# Patient Record
Sex: FEMALE | Race: WHITE | Employment: UNEMPLOYED | RURAL
[De-identification: names, ages, dates, MRNs, and addresses within clinical notes are randomized per-mention and may not be internally consistent; named-entity substitution may affect disease eponyms.]

---

## 2019-07-08 ENCOUNTER — OFFICE VISIT (OUTPATIENT)
Dept: FAMILY MEDICINE CLINIC | Age: 54
End: 2019-07-08
Payer: COMMERCIAL

## 2019-07-08 ENCOUNTER — HOSPITAL ENCOUNTER (OUTPATIENT)
Facility: HOSPITAL | Age: 54
Discharge: HOME | End: 2019-07-08
Payer: COMMERCIAL

## 2019-07-08 VITALS
WEIGHT: 180 LBS | DIASTOLIC BLOOD PRESSURE: 88 MMHG | HEIGHT: 64 IN | OXYGEN SATURATION: 95 % | TEMPERATURE: 98.4 F | RESPIRATION RATE: 18 BRPM | HEART RATE: 95 BPM | BODY MASS INDEX: 30.73 KG/M2 | SYSTOLIC BLOOD PRESSURE: 150 MMHG

## 2019-07-08 DIAGNOSIS — J01.00 ACUTE NON-RECURRENT MAXILLARY SINUSITIS: ICD-10-CM

## 2019-07-08 DIAGNOSIS — R50.9 FEVER, UNSPECIFIED FEVER CAUSE: ICD-10-CM

## 2019-07-08 DIAGNOSIS — J02.9 SORE THROAT: ICD-10-CM

## 2019-07-08 DIAGNOSIS — J02.9 SORE THROAT: Primary | ICD-10-CM

## 2019-07-08 PROCEDURE — G8417 CALC BMI ABV UP PARAM F/U: HCPCS | Performed by: NURSE PRACTITIONER

## 2019-07-08 PROCEDURE — 3017F COLORECTAL CA SCREEN DOC REV: CPT | Performed by: NURSE PRACTITIONER

## 2019-07-08 PROCEDURE — 99203 OFFICE O/P NEW LOW 30 MIN: CPT | Performed by: NURSE PRACTITIONER

## 2019-07-08 PROCEDURE — 87880 STREP A ASSAY W/OPTIC: CPT | Performed by: NURSE PRACTITIONER

## 2019-07-08 PROCEDURE — 1036F TOBACCO NON-USER: CPT | Performed by: NURSE PRACTITIONER

## 2019-07-08 PROCEDURE — 96372 THER/PROPH/DIAG INJ SC/IM: CPT | Performed by: NURSE PRACTITIONER

## 2019-07-08 PROCEDURE — 36415 COLL VENOUS BLD VENIPUNCTURE: CPT

## 2019-07-08 PROCEDURE — G8427 DOCREV CUR MEDS BY ELIG CLIN: HCPCS | Performed by: NURSE PRACTITIONER

## 2019-07-08 RX ORDER — METHYLPREDNISOLONE ACETATE 80 MG/ML
80 INJECTION, SUSPENSION INTRA-ARTICULAR; INTRALESIONAL; INTRAMUSCULAR; SOFT TISSUE ONCE
Status: COMPLETED | OUTPATIENT
Start: 2019-07-08 | End: 2019-07-08

## 2019-07-08 RX ORDER — THYROID 60 MG
60 TABLET ORAL DAILY
Refills: 1 | COMMUNITY
Start: 2019-06-11

## 2019-07-08 RX ORDER — FLUTICASONE PROPIONATE 50 MCG
1 SPRAY, SUSPENSION (ML) NASAL DAILY
COMMUNITY

## 2019-07-08 RX ORDER — AZITHROMYCIN 250 MG/1
TABLET, FILM COATED ORAL
Qty: 1 PACKET | Refills: 0 | Status: SHIPPED | OUTPATIENT
Start: 2019-07-08 | End: 2019-07-30

## 2019-07-08 RX ORDER — ALBUTEROL SULFATE 90 UG/1
2 AEROSOL, METERED RESPIRATORY (INHALATION) EVERY 6 HOURS PRN
COMMUNITY
End: 2021-11-01 | Stop reason: ALTCHOICE

## 2019-07-08 RX ORDER — FEXOFENADINE HCL 180 MG/1
180 TABLET ORAL DAILY
COMMUNITY
End: 2021-11-01 | Stop reason: ALTCHOICE

## 2019-07-08 RX ADMIN — METHYLPREDNISOLONE ACETATE 80 MG: 80 INJECTION, SUSPENSION INTRA-ARTICULAR; INTRALESIONAL; INTRAMUSCULAR; SOFT TISSUE at 15:46

## 2019-07-08 SDOH — HEALTH STABILITY: MENTAL HEALTH: HOW MANY STANDARD DRINKS CONTAINING ALCOHOL DO YOU HAVE ON A TYPICAL DAY?: 1 OR 2

## 2019-07-08 SDOH — HEALTH STABILITY: MENTAL HEALTH: HOW OFTEN DO YOU HAVE A DRINK CONTAINING ALCOHOL?: 4 OR MORE TIMES A WEEK

## 2019-07-08 ASSESSMENT — ENCOUNTER SYMPTOMS
RHINORRHEA: 1
COUGH: 1
SORE THROAT: 1
GASTROINTESTINAL NEGATIVE: 1
EYES NEGATIVE: 1
URTICARIA: 1
SINUS PRESSURE: 1
NAUSEA: 0
SINUS PAIN: 1
ALLERGIC/IMMUNOLOGIC NEGATIVE: 1
VOMITING: 0

## 2019-07-08 NOTE — PROGRESS NOTES
Administrations This Visit     methylPREDNISolone acetate (DEPO-MEDROL) injection 80 mg     Admin Date  07/08/2019  15:46 Action  Given Dose  80 mg Route  Intramuscular Site  Dorsogluteal Right Administered By  Junaid Spencer MA    Ordering Provider:  ELBA Hernandez NP    NDC:  3107-2064-24    Lot#:  [id number]    :  [employer]    Patient Supplied?:  No    Comments:  exp 04/2020

## 2019-07-08 NOTE — PROGRESS NOTES
SUBJECTIVE:    Li Carrera is a 49 y.o. female    Pt also reports she broke out in hives yesterday. Pt reports hx of chronic hives. Pt did eat a new gluten free tortilla last night prior to onset of hives. Hives have resolved. Pt reports no hx of hypertension. BP is noted to be elevated today. Pt has been taking OTC Sudafed. Pt is concerned about possible Polycythemia Vera due to family history. Pt reports her mother and sister found out they had polycythemia vera when their BP became elevated. Pt is unsure when she had labs drawn last. Pt recently moved to [place] from [place]. Pharyngitis   This is a new problem. The current episode started in the past 7 days. The problem occurs constantly. The problem has been unchanged. Associated symptoms include chills, congestion (sinus congestion), coughing (productive cough- due to PND- Yellow/green sputum), fatigue (daughter diagnosed with Mono recently), a fever (101), headaches, myalgias (a few days ago) and a sore throat. Pertinent negatives include no nausea or vomiting. Associated symptoms comments: Daughter recently diagnosed with Mono, + frontal and maxillary sinus pressure- worse when bending over, Yellow/green nasal drainage  . The symptoms are aggravated by sneezing (worse when talking). She has tried NSAIDs for the symptoms. The treatment provided moderate relief. Urticaria   Associated symptoms include congestion (sinus congestion), coughing (productive cough- due to PND- Yellow/green sputum), fatigue (daughter diagnosed with Mono recently), a fever (101), rhinorrhea (yellow/green drainage) and a sore throat. Pertinent negatives include no vomiting. Chief Complaint   Patient presents with    Pharyngitis     onset over 1 week    Headache     Sinus headache    Urticaria     onset yesterday        Review of Systems   Constitutional: Positive for chills, fatigue (daughter diagnosed with Mono recently) and fever (101).    HENT: Positive for congestion (sinus congestion), ear pain (right ear fullness), postnasal drip, rhinorrhea (yellow/green drainage), sinus pressure, sinus pain, sneezing and sore throat. Negative for ear discharge. Eyes: Negative. Respiratory: Positive for cough (productive cough- due to PND- Yellow/green sputum). Cardiovascular: Negative. Gastrointestinal: Negative. Negative for nausea and vomiting. Endocrine: Negative. Genitourinary: Negative. Musculoskeletal: Positive for myalgias (a few days ago). Allergic/Immunologic: Negative. Neurological: Positive for headaches. Hematological: Negative. Psychiatric/Behavioral: Negative. OBJECTIVE:    BP (!) 150/88   Pulse 95   Temp 98.4 °F (36.9 °C) (Oral)   Resp 18   Ht 5' 3.5\" (1.613 m)   Wt 180 lb (81.6 kg)   SpO2 95%   BMI 31.39 kg/m²    Physical Exam   Constitutional: She appears well-developed and well-nourished. HENT:   Head: Normocephalic. Right Ear: Tympanic membrane, external ear and ear canal normal.   Left Ear: External ear and ear canal normal. A middle ear effusion (mild) is present. Nose: Mucosal edema present. Right sinus exhibits maxillary sinus tenderness and frontal sinus tenderness. Left sinus exhibits maxillary sinus tenderness and frontal sinus tenderness. Mouth/Throat: Uvula is midline and mucous membranes are normal. Posterior oropharyngeal erythema ( + PND) present. No oropharyngeal exudate or posterior oropharyngeal edema. Cardiovascular: Normal rate, regular rhythm and normal heart sounds. Pulmonary/Chest: Effort normal and breath sounds normal.   Lymphadenopathy:        Head (right side): No submental, no submandibular, no tonsillar, no preauricular, no posterior auricular and no occipital adenopathy present. Head (left side): No submental, no submandibular, no tonsillar, no preauricular, no posterior auricular and no occipital adenopathy present.      She has cervical adenopathy (posterior cervical nodes are

## 2019-07-09 LAB — S PYO AG THROAT QL: NORMAL

## 2019-07-19 DIAGNOSIS — N76.0 ACUTE VAGINITIS: Primary | ICD-10-CM

## 2019-07-19 RX ORDER — FLUCONAZOLE 150 MG/1
150 TABLET ORAL ONCE
Qty: 1 TABLET | Refills: 0 | Status: SHIPPED | OUTPATIENT
Start: 2019-07-19 | End: 2019-07-19

## 2019-07-30 ENCOUNTER — APPOINTMENT (OUTPATIENT)
Dept: GENERAL RADIOLOGY | Facility: HOSPITAL | Age: 54
End: 2019-07-30
Payer: COMMERCIAL

## 2019-07-30 ENCOUNTER — TELEPHONE (OUTPATIENT)
Dept: FAMILY MEDICINE CLINIC | Age: 54
End: 2019-07-30

## 2019-07-30 ENCOUNTER — HOSPITAL ENCOUNTER (EMERGENCY)
Facility: HOSPITAL | Age: 54
Discharge: NURSING, REHAB OR LONG-TERM CARE | End: 2019-07-30
Attending: HOSPITALIST
Payer: COMMERCIAL

## 2019-07-30 ENCOUNTER — OFFICE VISIT (OUTPATIENT)
Dept: FAMILY MEDICINE CLINIC | Age: 54
End: 2019-07-30
Payer: COMMERCIAL

## 2019-07-30 ENCOUNTER — HOSPITAL ENCOUNTER (OUTPATIENT)
Facility: HOSPITAL | Age: 54
Setting detail: OBSERVATION
Discharge: HOME | End: 2019-07-31
Attending: INTERNAL MEDICINE | Admitting: INTERNAL MEDICINE

## 2019-07-30 VITALS
DIASTOLIC BLOOD PRESSURE: 81 MMHG | HEART RATE: 72 BPM | BODY MASS INDEX: 30.73 KG/M2 | OXYGEN SATURATION: 100 % | TEMPERATURE: 98.4 F | RESPIRATION RATE: 15 BRPM | HEIGHT: 64 IN | WEIGHT: 180 LBS | SYSTOLIC BLOOD PRESSURE: 148 MMHG

## 2019-07-30 VITALS
HEART RATE: 85 BPM | OXYGEN SATURATION: 96 % | WEIGHT: 180.6 LBS | BODY MASS INDEX: 31.49 KG/M2 | DIASTOLIC BLOOD PRESSURE: 89 MMHG | SYSTOLIC BLOOD PRESSURE: 146 MMHG

## 2019-07-30 DIAGNOSIS — I48.91 ATRIAL FIBRILLATION, NEW ONSET (HCC): Primary | ICD-10-CM

## 2019-07-30 DIAGNOSIS — R07.89 CHEST PRESSURE: ICD-10-CM

## 2019-07-30 DIAGNOSIS — R94.31 ABNORMAL EKG: ICD-10-CM

## 2019-07-30 DIAGNOSIS — R03.0 ELEVATED BLOOD PRESSURE READING IN OFFICE WITHOUT DIAGNOSIS OF HYPERTENSION: Primary | ICD-10-CM

## 2019-07-30 DIAGNOSIS — E03.9 HYPOTHYROIDISM, UNSPECIFIED TYPE: ICD-10-CM

## 2019-07-30 DIAGNOSIS — I49.9 IRREGULAR HEART BEAT: ICD-10-CM

## 2019-07-30 PROBLEM — I48.0 PAROXYSMAL ATRIAL FIBRILLATION (HCC): Status: ACTIVE | Noted: 2019-07-30

## 2019-07-30 LAB
A/G RATIO: 1.3 (ref 0.8–2)
ALBUMIN SERPL-MCNC: 4.6 G/DL (ref 3.4–4.8)
ALP BLD-CCNC: 72 U/L (ref 25–100)
ALT SERPL-CCNC: 22 U/L (ref 4–36)
ANION GAP SERPL CALCULATED.3IONS-SCNC: 14 MMOL/L (ref 3–16)
AST SERPL-CCNC: 25 U/L (ref 8–33)
BASOPHILS ABSOLUTE: 0 K/UL (ref 0–0.1)
BASOPHILS RELATIVE PERCENT: 0.3 %
BILIRUB SERPL-MCNC: 0.4 MG/DL (ref 0.3–1.2)
BUN BLDV-MCNC: 18 MG/DL (ref 6–20)
CALCIUM SERPL-MCNC: 10.2 MG/DL (ref 8.5–10.5)
CHLORIDE BLD-SCNC: 102 MMOL/L (ref 98–107)
CO2: 24 MMOL/L (ref 20–30)
CREAT SERPL-MCNC: 0.7 MG/DL (ref 0.4–1.2)
EOSINOPHILS ABSOLUTE: 0.1 K/UL (ref 0–0.4)
EOSINOPHILS RELATIVE PERCENT: 1.5 %
GFR AFRICAN AMERICAN: >59
GFR NON-AFRICAN AMERICAN: >60
GLOBULIN: 3.6 G/DL
GLUCOSE BLD-MCNC: 100 MG/DL (ref 74–106)
HCT VFR BLD CALC: 47.5 % (ref 37–47)
HEMOGLOBIN: 16.1 G/DL (ref 11.5–16.5)
IMMATURE GRANULOCYTES #: 0 K/UL
IMMATURE GRANULOCYTES %: 0.2 % (ref 0–5)
LYMPHOCYTES ABSOLUTE: 2.2 K/UL (ref 1.5–4)
LYMPHOCYTES RELATIVE PERCENT: 25.2 %
MCH RBC QN AUTO: 31.6 PG (ref 27–32)
MCHC RBC AUTO-ENTMCNC: 33.9 G/DL (ref 31–35)
MCV RBC AUTO: 93.3 FL (ref 80–100)
MONOCYTES ABSOLUTE: 0.6 K/UL (ref 0.2–0.8)
MONOCYTES RELATIVE PERCENT: 6.8 %
NEUTROPHILS ABSOLUTE: 5.8 K/UL (ref 2–7.5)
NEUTROPHILS RELATIVE PERCENT: 66 %
PDW BLD-RTO: 12.9 % (ref 11–16)
PLATELET # BLD: 310 K/UL (ref 150–400)
PMV BLD AUTO: 9.9 FL (ref 6–10)
POTASSIUM REFLEX MAGNESIUM: 3.9 MMOL/L (ref 3.4–5.1)
RBC # BLD: 5.09 M/UL (ref 3.8–5.8)
SODIUM BLD-SCNC: 140 MMOL/L (ref 136–145)
TOTAL PROTEIN: 8.2 G/DL (ref 6.4–8.3)
TROPONIN I SERPL-MCNC: <0.012 NG/ML (ref 0–0.03)
TROPONIN: <0.3 NG/ML
TSH REFLEX: 0.85 UIU/ML (ref 0.35–5.5)
WBC # BLD: 8.8 K/UL (ref 4–11)

## 2019-07-30 PROCEDURE — G0378 HOSPITAL OBSERVATION PER HR: HCPCS

## 2019-07-30 PROCEDURE — 84443 ASSAY THYROID STIM HORMONE: CPT

## 2019-07-30 PROCEDURE — 3017F COLORECTAL CA SCREEN DOC REV: CPT | Performed by: NURSE PRACTITIONER

## 2019-07-30 PROCEDURE — G0379 DIRECT REFER HOSPITAL OBSERV: HCPCS

## 2019-07-30 PROCEDURE — 96374 THER/PROPH/DIAG INJ IV PUSH: CPT

## 2019-07-30 PROCEDURE — 96372 THER/PROPH/DIAG INJ SC/IM: CPT

## 2019-07-30 PROCEDURE — G8417 CALC BMI ABV UP PARAM F/U: HCPCS | Performed by: NURSE PRACTITIONER

## 2019-07-30 PROCEDURE — 84484 ASSAY OF TROPONIN QUANT: CPT

## 2019-07-30 PROCEDURE — 6360000002 HC RX W HCPCS: Performed by: HOSPITALIST

## 2019-07-30 PROCEDURE — 71045 X-RAY EXAM CHEST 1 VIEW: CPT

## 2019-07-30 PROCEDURE — 99220 PR INITIAL OBSERVATION CARE/DAY 70 MINUTES: CPT | Performed by: INTERNAL MEDICINE

## 2019-07-30 PROCEDURE — 2500000003 HC RX 250 WO HCPCS: Performed by: HOSPITALIST

## 2019-07-30 PROCEDURE — G8428 CUR MEDS NOT DOCUMENT: HCPCS | Performed by: NURSE PRACTITIONER

## 2019-07-30 PROCEDURE — 36415 COLL VENOUS BLD VENIPUNCTURE: CPT

## 2019-07-30 PROCEDURE — 80053 COMPREHEN METABOLIC PANEL: CPT

## 2019-07-30 PROCEDURE — 99284 EMERGENCY DEPT VISIT MOD MDM: CPT

## 2019-07-30 PROCEDURE — 1036F TOBACCO NON-USER: CPT | Performed by: NURSE PRACTITIONER

## 2019-07-30 PROCEDURE — 84484 ASSAY OF TROPONIN QUANT: CPT | Performed by: INTERNAL MEDICINE

## 2019-07-30 PROCEDURE — 93005 ELECTROCARDIOGRAM TRACING: CPT

## 2019-07-30 PROCEDURE — 2580000003 HC RX 258: Performed by: HOSPITALIST

## 2019-07-30 PROCEDURE — 85025 COMPLETE CBC W/AUTO DIFF WBC: CPT

## 2019-07-30 PROCEDURE — 99214 OFFICE O/P EST MOD 30 MIN: CPT | Performed by: NURSE PRACTITIONER

## 2019-07-30 RX ORDER — CHLORAL HYDRATE 500 MG
CAPSULE ORAL
COMMUNITY
End: 2022-01-21

## 2019-07-30 RX ORDER — MULTIPLE VITAMINS W/ MINERALS TAB 9MG-400MCG
1 TAB ORAL DAILY
Status: DISCONTINUED | OUTPATIENT
Start: 2019-07-31 | End: 2019-07-31 | Stop reason: HOSPADM

## 2019-07-30 RX ORDER — ONDANSETRON 2 MG/ML
4 INJECTION INTRAMUSCULAR; INTRAVENOUS EVERY 6 HOURS PRN
Status: DISCONTINUED | OUTPATIENT
Start: 2019-07-30 | End: 2019-07-31 | Stop reason: HOSPADM

## 2019-07-30 RX ORDER — DILTIAZEM HYDROCHLORIDE 5 MG/ML
10 INJECTION INTRAVENOUS ONCE
Status: DISCONTINUED | OUTPATIENT
Start: 2019-07-30 | End: 2019-07-30 | Stop reason: HOSPADM

## 2019-07-30 RX ORDER — DILTIAZEM HYDROCHLORIDE 5 MG/ML
20 INJECTION INTRAVENOUS ONCE
Status: COMPLETED | OUTPATIENT
Start: 2019-07-30 | End: 2019-07-30

## 2019-07-30 RX ORDER — CETIRIZINE HYDROCHLORIDE 10 MG/1
10 TABLET ORAL DAILY
Status: DISCONTINUED | OUTPATIENT
Start: 2019-07-31 | End: 2019-07-31 | Stop reason: HOSPADM

## 2019-07-30 RX ORDER — ACETAMINOPHEN 325 MG/1
650 TABLET ORAL EVERY 4 HOURS PRN
Status: DISCONTINUED | OUTPATIENT
Start: 2019-07-30 | End: 2019-07-31 | Stop reason: HOSPADM

## 2019-07-30 RX ORDER — SODIUM CHLORIDE 0.9 % (FLUSH) 0.9 %
3 SYRINGE (ML) INJECTION EVERY 12 HOURS SCHEDULED
Status: DISCONTINUED | OUTPATIENT
Start: 2019-07-30 | End: 2019-07-31 | Stop reason: HOSPADM

## 2019-07-30 RX ORDER — FLUTICASONE PROPIONATE 50 MCG
1 SPRAY, SUSPENSION (ML) NASAL DAILY
Status: DISCONTINUED | OUTPATIENT
Start: 2019-07-31 | End: 2019-07-31 | Stop reason: HOSPADM

## 2019-07-30 RX ORDER — FEXOFENADINE HCL 180 MG/1
180 TABLET ORAL DAILY
COMMUNITY
End: 2020-02-07

## 2019-07-30 RX ORDER — SODIUM CHLORIDE 0.9 % (FLUSH) 0.9 %
3-10 SYRINGE (ML) INJECTION AS NEEDED
Status: DISCONTINUED | OUTPATIENT
Start: 2019-07-30 | End: 2019-07-31 | Stop reason: HOSPADM

## 2019-07-30 RX ORDER — MULTIPLE VITAMINS W/ MINERALS TAB 9MG-400MCG
1 TAB ORAL DAILY
COMMUNITY

## 2019-07-30 RX ORDER — DEXTROSE MONOHYDRATE 50 MG/ML
INJECTION, SOLUTION INTRAVENOUS
Status: DISCONTINUED
Start: 2019-07-30 | End: 2019-07-30 | Stop reason: HOSPADM

## 2019-07-30 RX ORDER — LEVOTHYROXINE AND LIOTHYRONINE 9.5; 2.25 UG/1; UG/1
60 TABLET ORAL 2 TIMES DAILY
Status: DISCONTINUED | OUTPATIENT
Start: 2019-07-30 | End: 2019-07-30

## 2019-07-30 RX ORDER — DILTIAZEM HYDROCHLORIDE 5 MG/ML
INJECTION INTRAVENOUS
Status: DISCONTINUED
Start: 2019-07-30 | End: 2019-07-30 | Stop reason: HOSPADM

## 2019-07-30 RX ORDER — BISACODYL 10 MG
10 SUPPOSITORY, RECTAL RECTAL DAILY PRN
Status: DISCONTINUED | OUTPATIENT
Start: 2019-07-30 | End: 2019-07-31 | Stop reason: HOSPADM

## 2019-07-30 RX ORDER — CHOLECALCIFEROL (VITAMIN D3) 125 MCG
5 CAPSULE ORAL NIGHTLY PRN
Status: DISCONTINUED | OUTPATIENT
Start: 2019-07-30 | End: 2019-07-31 | Stop reason: HOSPADM

## 2019-07-30 RX ORDER — PROGESTERONE 100 %
4 POWDER (GRAM) MISCELLANEOUS NIGHTLY
Status: DISCONTINUED | OUTPATIENT
Start: 2019-07-31 | End: 2019-07-31 | Stop reason: HOSPADM

## 2019-07-30 RX ORDER — TESTOSTERONE MICRONIZED 100 %
1 POWDER (GRAM) MISCELLANEOUS DAILY
Status: DISCONTINUED | OUTPATIENT
Start: 2019-07-31 | End: 2019-07-31 | Stop reason: HOSPADM

## 2019-07-30 RX ORDER — LEVOTHYROXINE AND LIOTHYRONINE 9.5; 2.25 UG/1; UG/1
60 TABLET ORAL 2 TIMES DAILY
Status: DISCONTINUED | OUTPATIENT
Start: 2019-07-31 | End: 2019-07-31 | Stop reason: HOSPADM

## 2019-07-30 RX ORDER — LEVOTHYROXINE AND LIOTHYRONINE 38; 9 UG/1; UG/1
60 TABLET ORAL DAILY
COMMUNITY

## 2019-07-30 RX ORDER — ALBUTEROL SULFATE 2.5 MG/3ML
2.5 SOLUTION RESPIRATORY (INHALATION) EVERY 6 HOURS PRN
Status: DISCONTINUED | OUTPATIENT
Start: 2019-07-30 | End: 2019-07-31 | Stop reason: HOSPADM

## 2019-07-30 RX ADMIN — ENOXAPARIN SODIUM 80 MG: 80 INJECTION SUBCUTANEOUS at 18:00

## 2019-07-30 RX ADMIN — DILTIAZEM HYDROCHLORIDE 5 MG/HR: 5 INJECTION INTRAVENOUS at 18:22

## 2019-07-30 RX ADMIN — DILTIAZEM HYDROCHLORIDE 20 MG: 5 INJECTION INTRAVENOUS at 18:00

## 2019-07-30 RX ADMIN — METOPROLOL TARTRATE 25 MG: 25 TABLET ORAL at 23:58

## 2019-07-30 ASSESSMENT — ENCOUNTER SYMPTOMS
NAUSEA: 0
SHORTNESS OF BREATH: 1
EYE PAIN: 0
SORE THROAT: 0
COLOR CHANGE: 0
COUGH: 0
CONSTIPATION: 0
WHEEZING: 0
DIARRHEA: 0
ABDOMINAL PAIN: 0
SINUS PAIN: 0
BACK PAIN: 0
PHOTOPHOBIA: 0
CHEST TIGHTNESS: 1
ABDOMINAL DISTENTION: 0

## 2019-07-30 NOTE — ED NOTES
Patient was seen at the clinic back on 7/9/2019 d/t being sick, patient was found to be hypertensive and came back today for follow up for htn, patient received and ekg for abnormal heart rate and was sent for an abnormal ekg by [employer].      Teo Veras RN  07/30/19 2420

## 2019-07-30 NOTE — PROGRESS NOTES
gait.   Skin: Skin is warm and dry. Capillary refill takes less than 2 seconds. No rash noted. Psychiatric: She has a normal mood and affect. Her behavior is normal.   Nursing note and vitals reviewed. No results found for: NA, K, CL, CO2, GLUCOSE, BUN, CREATININE, CALCIUM, PROT, LABALBU, BILITOT, ALT, AST    No results found for: LABA1C, LABMICR, LDLCALC      Lab Results   Component Value Date    WBC 9.2 07/08/2019    NEUTROABS 6.4 07/08/2019    HGB 14.7 07/08/2019    HCT 41.6 07/08/2019    MCV 89 07/08/2019     07/08/2019       No results found for: TSH      ASSESSMENT/PLAN:     Pt seen for BP recheck today. Found irregular heartbeat and abnormal EKG showing possible Afib vs PVC/PACs but will defer to ED for further evaluation. HR fluctuates between 80-120s in clinic. No hx of cardiac issues. BP still elevated -150s. Having very mild chest pressure. Past several weeks increased fatigue, palpitations, SOB and intermittent chest pressure at times. No Chest Pain or acute distress at this time. With this being new finding for patient and due to symptoms, will send on to ED for further cardiac work up. Pt agreeable and verbal consent given to fax EKGs to ED. Report called to Jerson Smith ED RN at [facility]. Pt stable and  taking pt to [facility] ED.       1. Elevated blood pressure reading in office without diagnosis of hypertension  BP still elevated -150s. No hx HTN. Defer to ED. See above. - TSH with Reflex; Future  - COMPREHENSIVE METABOLIC PANEL; Future  - VITAMIN B12 & FOLATE; Future  - T3; Future  - External Referral To Cardiology      2. Abnormal EKG  See note above. Cardiology referral stat. Refer to ED. 3. Irregular heart beat  Found irregular heartbeat and abnormal EKG with no hx of cardiac issues or rhythm disorders. Cardiology referral. Labs. Defer to ED for further evaluation. Pt agreeable. - TSH with Reflex; Future  - COMPREHENSIVE METABOLIC PANEL;  Future  - VITAMIN B12 & FOLATE; Future  - T3; Future  - External Referral To Cardiology      4. Chest Pressure  Intermittent and increasing past several weeks. No CP or radiation to arm. Defer to ED. 5. Hypothyroidism, unspecified type  Last lab work in [place] and [place] about 8 months ago. Recheck levels. Defer to ED.    - VITAMIN B12 & FOLATE;  Future  - T3; Future  - External Referral To Cardiology

## 2019-07-31 ENCOUNTER — APPOINTMENT (OUTPATIENT)
Dept: CARDIOLOGY | Facility: HOSPITAL | Age: 54
End: 2019-07-31

## 2019-07-31 VITALS
TEMPERATURE: 97.9 F | DIASTOLIC BLOOD PRESSURE: 94 MMHG | WEIGHT: 185.4 LBS | HEIGHT: 64 IN | SYSTOLIC BLOOD PRESSURE: 121 MMHG | RESPIRATION RATE: 16 BRPM | HEART RATE: 50 BPM | BODY MASS INDEX: 31.65 KG/M2 | OXYGEN SATURATION: 99 %

## 2019-07-31 LAB
ANION GAP SERPL CALCULATED.3IONS-SCNC: 12.7 MMOL/L (ref 10–20)
BH CV ECHO MEAS - EF(MOD-BP): 65 %
BH CV ECHO MEAS - IVSD: 0.7 CM
BH CV ECHO MEAS - LA DIMENSION: 3.1 CM
BH CV ECHO MEAS - LVPWD: 0.7 CM
BH CV ECHO MEAS - RAP SYSTOLE: 5 MMHG
BH CV ECHO MEAS - RVSP: 36 MMHG
BUN BLD-MCNC: 13 MG/DL (ref 7–20)
BUN/CREAT SERPL: 21.7 (ref 7.1–23.5)
CALCIUM SPEC-SCNC: 9.5 MG/DL (ref 8.4–10.2)
CHLORIDE SERPL-SCNC: 107 MMOL/L (ref 98–107)
CHOLEST SERPL-MCNC: 158 MG/DL (ref 0–199)
CO2 SERPL-SCNC: 23 MMOL/L (ref 26–30)
CREAT BLD-MCNC: 0.6 MG/DL (ref 0.6–1.3)
DEPRECATED RDW RBC AUTO: 44.5 FL (ref 37–54)
ERYTHROCYTE [DISTWIDTH] IN BLOOD BY AUTOMATED COUNT: 13.1 % (ref 12.3–15.4)
GFR SERPL CREATININE-BSD FRML MDRD: 105 ML/MIN/1.73
GLUCOSE BLD-MCNC: 94 MG/DL (ref 74–98)
HCT VFR BLD AUTO: 43.6 % (ref 34–46.6)
HDLC SERPL-MCNC: 58 MG/DL (ref 40–60)
HGB BLD-MCNC: 14.3 G/DL (ref 12–15.9)
LDLC SERPL CALC-MCNC: 74 MG/DL (ref 0–99)
LDLC/HDLC SERPL: 1.27 {RATIO}
LEFT ATRIUM VOLUME INDEX: 28 ML/M2
LV EF 2D ECHO EST: 62 %
MAXIMAL PREDICTED HEART RATE: 167 BPM
MCH RBC QN AUTO: 30.6 PG (ref 26.6–33)
MCHC RBC AUTO-ENTMCNC: 32.8 G/DL (ref 31.5–35.7)
MCV RBC AUTO: 93.2 FL (ref 79–97)
NT-PROBNP SERPL-MCNC: 227 PG/ML (ref 0–125)
PLATELET # BLD AUTO: 235 10*3/MM3 (ref 140–450)
PMV BLD AUTO: 10.4 FL (ref 6–12)
POTASSIUM BLD-SCNC: 3.7 MMOL/L (ref 3.5–5.1)
RBC # BLD AUTO: 4.68 10*6/MM3 (ref 3.77–5.28)
SODIUM BLD-SCNC: 139 MMOL/L (ref 137–145)
STRESS TARGET HR: 142 BPM
TRIGL SERPL-MCNC: 131 MG/DL
TROPONIN I SERPL-MCNC: <0.012 NG/ML (ref 0–0.03)
VLDLC SERPL-MCNC: 26.2 MG/DL
WBC NRBC COR # BLD: 8.39 10*3/MM3 (ref 3.4–10.8)

## 2019-07-31 PROCEDURE — 93306 TTE W/DOPPLER COMPLETE: CPT

## 2019-07-31 PROCEDURE — 83880 ASSAY OF NATRIURETIC PEPTIDE: CPT | Performed by: INTERNAL MEDICINE

## 2019-07-31 PROCEDURE — 80048 BASIC METABOLIC PNL TOTAL CA: CPT | Performed by: INTERNAL MEDICINE

## 2019-07-31 PROCEDURE — 80061 LIPID PANEL: CPT | Performed by: INTERNAL MEDICINE

## 2019-07-31 PROCEDURE — 93306 TTE W/DOPPLER COMPLETE: CPT | Performed by: INTERNAL MEDICINE

## 2019-07-31 PROCEDURE — G0378 HOSPITAL OBSERVATION PER HR: HCPCS

## 2019-07-31 PROCEDURE — 85027 COMPLETE CBC AUTOMATED: CPT | Performed by: INTERNAL MEDICINE

## 2019-07-31 PROCEDURE — 84484 ASSAY OF TROPONIN QUANT: CPT | Performed by: INTERNAL MEDICINE

## 2019-07-31 PROCEDURE — 99217 PR OBSERVATION CARE DISCHARGE MANAGEMENT: CPT | Performed by: INTERNAL MEDICINE

## 2019-07-31 RX ORDER — PROGESTERONE 100 %
800 POWDER (GRAM) MISCELLANEOUS DAILY
COMMUNITY
End: 2020-02-07

## 2019-07-31 RX ORDER — FLECAINIDE ACETATE 50 MG/1
50 TABLET ORAL EVERY 12 HOURS SCHEDULED
Status: DISCONTINUED | OUTPATIENT
Start: 2019-07-31 | End: 2019-07-31 | Stop reason: HOSPADM

## 2019-07-31 RX ORDER — FLECAINIDE ACETATE 50 MG/1
50 TABLET ORAL EVERY 12 HOURS SCHEDULED
Qty: 60 TABLET | Refills: 0 | Status: SHIPPED | OUTPATIENT
Start: 2019-07-31 | End: 2020-05-29

## 2019-07-31 RX ORDER — FLUTICASONE PROPIONATE 50 MCG
2 SPRAY, SUSPENSION (ML) NASAL DAILY
COMMUNITY

## 2019-07-31 RX ADMIN — METOPROLOL TARTRATE 25 MG: 25 TABLET ORAL at 08:41

## 2019-07-31 RX ADMIN — APIXABAN 5 MG: 5 TABLET, FILM COATED ORAL at 10:27

## 2019-07-31 RX ADMIN — Medication 1 APPLICATOR: at 08:41

## 2019-07-31 RX ADMIN — FLUTICASONE PROPIONATE 1 SPRAY: 50 SPRAY, METERED NASAL at 08:41

## 2019-07-31 RX ADMIN — MULTIPLE VITAMINS W/ MINERALS TAB 1 TABLET: TAB at 08:40

## 2019-07-31 RX ADMIN — SODIUM CHLORIDE, PRESERVATIVE FREE 3 ML: 5 INJECTION INTRAVENOUS at 08:42

## 2019-07-31 RX ADMIN — FLECAINIDE ACETATE 50 MG: 50 TABLET ORAL at 10:27

## 2019-07-31 RX ADMIN — THYROID, PORCINE 60 MG: 15 TABLET ORAL at 06:22

## 2019-07-31 NOTE — ED NOTES
Patient back to bed at this time, patient converted back to sinus rhythm, ekg obtained at this time.      Bubba Amaro RN  07/30/19 2031

## 2019-07-31 NOTE — ED NOTES
Patient in and out of sinus rhythm at this time for a couple of minutes.      Sidney William RN  07/30/19 2033

## 2019-07-31 NOTE — ED NOTES
Cassie notified at [facility] of update of rhythm changes in and out of sinus rhythm prior to leaving and ekg sent as well, also updated that patient is currently leaving at this time.      Ralph Cross RN  07/30/19 2036

## 2019-08-01 NOTE — PROGRESS NOTES
[facility] AT [address], [address]  [place], [place], [address] [phone]  [email or web address].[email or web address]           OUTPATIENT CLINIC CONSULTATION NOTE    Patient care team:  Patient Care Team:  Emma Woodson APRN as PCP - General (Family Medicine)    Requesting Provider and Reason for consultation: The patient is being seen today at the request of EDOUARD Rush for atrial fibrillation.     Subjective:   Chief complaint:   Chief Complaint   Patient presents with   • Follow-up       HPI:    Caroline Harding is a 54 y.o. female.  The patient has a history of paroxysmal atrial fibrillation, hypertension, hypothyroidism, asthma, and celiac disease.  She presents today for consultation for atrial fibrillation.    For at least the past year she has noted palpitations that have been worsening in frequency and severity that was associated with shortness of breath.  She followed up with her PCP on Tuesday and was found to be in atrial fibrillation.  She was sent to the ED and later transferred to [facility] where she converted to normal sinus rhythm after receiving diltiazem.  She was evaluated by Dr. lorenz at that time.  She was started on Eliquis, flecainide, and metoprolol.  She also reports dependent lower extremity edema normal.  She has had blurred vision since 2011 and undergone a thorough work-up but it causes unknown.  She denies any chest pain, lightheadedness, syncope, orthopnea, or PND.  Taking 2 alcoholic beverages daily and 2 cups of coffee.    Review of Systems:  For palpitations, shortness of breath, lower extremity edema, blurred vision  Negative for exertional chest pain, dyspnea with exertion, orthopnea, PND, lightheadedness, syncope.   All other systems reviewed and are negative.    PFSH:  Patient Active Problem List   Diagnosis   • Paroxysmal atrial fibrillation (CMS/HCC)   • A-fib (CMS/HCC)         Current Outpatient Medications:   •   apixaban (ELIQUIS) 5 MG tablet tablet, Take 1 tablet by mouth Every 12 (Twelve) Hours., Disp: 60 tablet, Rfl: 0  •  fexofenadine (ALLEGRA) 180 MG tablet, Take 180 mg by mouth Daily., Disp: , Rfl:   •  flecainide (TAMBOCOR) 50 MG tablet, Take 1 tablet by mouth Every 12 (Twelve) Hours., Disp: 60 tablet, Rfl: 0  •  fluticasone (FLONASE) 50 MCG/ACT nasal spray, 2 sprays into the nostril(s) as directed by provider Daily., Disp: , Rfl:   •  fluticasone (VERAMYST) 27.5 MCG/SPRAY nasal spray, 2 sprays into the nostril(s) as directed by provider Daily., Disp: , Rfl:   •  metoprolol tartrate (LOPRESSOR) 25 MG tablet, Take 1 tablet by mouth Every 12 (Twelve) Hours., Disp: 60 tablet, Rfl: 0  •  Multiple Vitamins-Minerals (MULTIVITAMIN WITH MINERALS) tablet tablet, Take 1 tablet by mouth Daily., Disp: , Rfl:   •  Omega-3 Fatty Acids (FISH OIL) 1000 MG capsule capsule, Take  by mouth Daily With Breakfast., Disp: , Rfl:   •  Progesterone Milled powder, Place 800 mg under the tongue Daily., Disp: , Rfl:   •  Testosterone 20 % cream, 1 application Daily., Disp: , Rfl:   •  Thyroid 60 MG PO tablet, Take 60 mg by mouth 2 (Two) Times a Day., Disp: , Rfl:     Allergies   Allergen Reactions   • Penicillins Anaphylaxis   • Gluten Meal Other (See Comments)   • Sulfa Antibiotics Hives   • Wheat Bran Other (See Comments)       Social History     Socioeconomic History   • Marital status:      Spouse name: Not on file   • Number of children: Not on file   • Years of education: Not on file   • Highest education level: Not on file   Tobacco Use   • Smoking status: Never Smoker   • Smokeless tobacco: Never Used   Substance and Sexual Activity   • Alcohol use: Yes     Alcohol/week: 7.2 oz     Types: 12 Shots of liquor per week   • Drug use: No   • Sexual activity: Yes     Partners: Male     Birth control/protection: None     Family History   Problem Relation Age of Onset   • COPD Mother    • COPD Sister          Objective:   Physical  "Exam:  Blood pressure 124/72, pulse 57, height 162.6 cm (64\"), weight 80.8 kg (178 lb 3.2 oz), SpO2 96 %.   CONSTITUTIONAL: Well-nourished. In no acute distress.   SKIN: Warm and dry. No rashes noted  HEENT: Head is normocephalic and atraumatic. Pupils are equal and reactive to light bilaterally. Mucous membranes are pink and moist.   NECK: Supple without masses or thyromegaly. There is no jugular venous distention at 30°.  LUNGS: Normal effort. Clear to auscultation bilaterally without wheezing, rhonchi, or rales noted.   CARDIOVASCULAR: Regular rate with a normal S1 and S2. There is no murmur, gallop, rub, or click appreciated. Carotid upstrokes are 2+ and symmetrical without bruits. There is no peripheral edema.  Dorsalis pedis pulses 2+ bilaterally.  ABDOMEN: Soft and nondistended. No tenderness with palpitation.   MUSCULOSKELETAL:  No digital cyanosis  NEUROLOGICAL: Nonfocal.  PSYCHIATRIC: Alert, orientated x 3, appropriate affect     Labs:  BUN   Date Value Ref Range Status   07/31/2019 13 7 - 20 mg/dL Final     Creatinine   Date Value Ref Range Status   07/31/2019 0.60 0.60 - 1.30 mg/dL Final     Potassium   Date Value Ref Range Status   07/31/2019 3.7 3.5 - 5.1 mmol/L Final     WBC   Date Value Ref Range Status   07/31/2019 8.39 3.40 - 10.80 10*3/mm3 Final   07/30/2019 8.8 4.0 - 11.0 K/uL Final     Hemoglobin   Date Value Ref Range Status   07/31/2019 14.3 12.0 - 15.9 g/dL Final   07/30/2019 16.1 11.5 - 16.5 g/dL Final     Hematocrit   Date Value Ref Range Status   07/31/2019 43.6 34.0 - 46.6 % Final   07/30/2019 47.5 (H) 37.0 - 47.0 % Final     Platelets   Date Value Ref Range Status   07/31/2019 235 140 - 450 10*3/mm3 Final   07/30/2019 310 150 - 400 K/uL Final       Lab Results   Component Value Date    CHOL 158 07/31/2019     Lab Results   Component Value Date    TRIG 131 07/31/2019     Lab Results   Component Value Date    HDL 58 07/31/2019     Lab Results   Component Value Date    LDL 74 07/31/2019 "     No components found for: LDLDIRECTC    Diagnostic Data:      ECG 12 Lead  Date/Time: 8/2/2019 12:49 PM  Performed by: iMchael Fisher MD  Authorized by: Michael Fisher MD   Comparison: compared with previous ECG from 7/30/2019  Comparison to previous ECG: bradycardic ectopic atrial rhythm  Rhythm comments: Ectopic atrial rhythm  Rate: bradycardic  BPM: 54  Comments: QRS 97 ms  QTc 418 ms            TTE 7/31/2019  · Estimated EF = 62%.  · Left ventricular systolic function is normal.  · Mild tricuspid valve regurgitation is present.  · Calculated right ventricular systolic pressure from tricuspid regurgitation is 36 mmHg.    Assessment and Plan:   Caroline was seen today for follow-up.    Diagnoses and all orders for this visit:    Paroxysmal atrial fibrillation (CMS/HCC)  -New diagnosis as of 7/2019  -Decrease metoprolol to 12.5 mg twice daily.  -Continue flecainide.  -Treadmill stress test to rule out evidence of coronary disease in the setting of using flecainide  -CHADSVASc score of 1 continue Eliquis 5 mg p.o. twice daily for 1 month.  Then discontinue and begin aspirin 81 mg p.o. Daily.    -Long-term it would be reasonable to come off of flecainide as a trial.  With that said the patient states that she has had intermittent palpitations even when she has tried stopping or decreasing her alcohol/caffeine use in the past prior to being on antiarrhythmics.    Essential hypertension  -At goal.  -Decrease metoprolol as above, due to relative bradycardia.  Will need to monitor blood pressure.    - Return in about 6 months (around 2/2/2020).    Scribed for Michael Fisher MD by EDOUARD Hager   8/2/2019  1:40 PM     I, Michael Fisher MD, personally performed the services as scribed by the above named individual. I have made any necessary edits and it is both accurate and complete.     Michael Fisher MD, MSc, [facility]  Interventional Cardiology  [facility] at [facility]

## 2019-08-02 ENCOUNTER — CONSULT (OUTPATIENT)
Dept: CARDIOLOGY | Facility: CLINIC | Age: 54
End: 2019-08-02

## 2019-08-02 ENCOUNTER — HOSPITAL ENCOUNTER (OUTPATIENT)
Facility: HOSPITAL | Age: 54
Discharge: HOME | End: 2019-08-02
Payer: COMMERCIAL

## 2019-08-02 ENCOUNTER — CARE COORDINATION (OUTPATIENT)
Dept: CARE COORDINATION | Age: 54
End: 2019-08-02

## 2019-08-02 VITALS
SYSTOLIC BLOOD PRESSURE: 124 MMHG | WEIGHT: 178.2 LBS | BODY MASS INDEX: 30.42 KG/M2 | HEIGHT: 64 IN | HEART RATE: 57 BPM | DIASTOLIC BLOOD PRESSURE: 72 MMHG | OXYGEN SATURATION: 96 %

## 2019-08-02 DIAGNOSIS — I10 ESSENTIAL HYPERTENSION: ICD-10-CM

## 2019-08-02 DIAGNOSIS — I48.0 PAROXYSMAL ATRIAL FIBRILLATION (HCC): Primary | ICD-10-CM

## 2019-08-02 PROCEDURE — 93005 ELECTROCARDIOGRAM TRACING: CPT

## 2019-08-02 PROCEDURE — 93000 ELECTROCARDIOGRAM COMPLETE: CPT | Performed by: INTERNAL MEDICINE

## 2019-08-02 PROCEDURE — 99204 OFFICE O/P NEW MOD 45 MIN: CPT | Performed by: INTERNAL MEDICINE

## 2019-08-07 ENCOUNTER — OFFICE VISIT (OUTPATIENT)
Dept: FAMILY MEDICINE CLINIC | Age: 54
End: 2019-08-07
Payer: COMMERCIAL

## 2019-08-07 VITALS
HEART RATE: 51 BPM | OXYGEN SATURATION: 97 % | RESPIRATION RATE: 18 BRPM | SYSTOLIC BLOOD PRESSURE: 138 MMHG | DIASTOLIC BLOOD PRESSURE: 85 MMHG

## 2019-08-07 DIAGNOSIS — I48.91 ATRIAL FIBRILLATION, UNSPECIFIED TYPE (HCC): Primary | ICD-10-CM

## 2019-08-07 DIAGNOSIS — I15.9 SECONDARY HYPERTENSION: ICD-10-CM

## 2019-08-07 PROCEDURE — 1111F DSCHRG MED/CURRENT MED MERGE: CPT | Performed by: NURSE PRACTITIONER

## 2019-08-07 PROCEDURE — 99215 OFFICE O/P EST HI 40 MIN: CPT | Performed by: NURSE PRACTITIONER

## 2019-08-07 RX ORDER — FLECAINIDE ACETATE 50 MG/1
25 TABLET ORAL 2 TIMES DAILY
COMMUNITY
End: 2019-09-16 | Stop reason: SDUPTHER

## 2019-08-07 RX ORDER — AMLODIPINE BESYLATE 5 MG/1
5 TABLET ORAL DAILY
Qty: 30 TABLET | Refills: 3 | Status: SHIPPED | OUTPATIENT
Start: 2019-08-07 | End: 2021-11-01 | Stop reason: ALTCHOICE

## 2019-08-07 ASSESSMENT — PATIENT HEALTH QUESTIONNAIRE - PHQ9
SUM OF ALL RESPONSES TO PHQ QUESTIONS 1-9: 0
SUM OF ALL RESPONSES TO PHQ9 QUESTIONS 1 & 2: 0
SUM OF ALL RESPONSES TO PHQ QUESTIONS 1-9: 0
1. LITTLE INTEREST OR PLEASURE IN DOING THINGS: 0
2. FEELING DOWN, DEPRESSED OR HOPELESS: 0

## 2019-08-07 NOTE — PATIENT INSTRUCTIONS
Education and Discharge Instructions:  Keep a list of your medicines with you at all times. Always bring a up to date list or the medication bottles when going to the doctor or hospital.   Always follow the directions on your medications unless the doctor or nurse has instructed you otherwise. Keep all medications out of reach of children. Store medicines according to the directions on the label. Seek emergency medical attention if you think you have used too much medication. A overdose can be fatal.  Don't share your medicines with anyone. It may harm them. Discard any unused or out dated medications. If you have any questions, ask your provider or pharmacist for more information. Be sure to keep all appointments for provider visits or tests. Please continue all your medications that the Provider has prescribed for you unless other [facility]    1. Mental Health Info and Treatment Ttrblza-[phone]  2. Drug and Alcohol Detox Rehab treatment 24 hr [phone]  3. Stop Smoking Classes- [facility]-[phone]  4. [address] Program- prescription [phone]  5. Hospice Care [phone]  6. Adult/Child Protection MSSJEDIN-[phone]    [employer]: Your online connection to your health information. Download the [employer] Systemsicare at [employer] ([employer]) or the [employer]	[place] ([employer]). [address] from the list of providers. [employer] Help Desk: 5-245-91-FRENM    [employer]        We are committed to providing you with the best care possible. In order to help us achieve these goals please remember to bring all medications, herbal products, and over the counter supplements with you to each visit. If your provider has ordered testing for you, please be sure to follow up with our office if you have not received results within 7 days after the testing took place.      *If you receive a survey

## 2019-08-07 NOTE — PROGRESS NOTES
eye movements intact, conjunctivae normal  ENT: tympanic membrane, external ear and ear canal normal bilaterally, nose without deformity, nasal mucosa and turbinates normal without polyps  Neck: supple and non-tender without mass, no thyromegaly or thyroid nodules, no cervical lymphadenopathy  Pulmonary/Chest: clear to auscultation bilaterally- no wheezes, rales or rhonchi, normal air movement, no respiratory distress  Cardiovascular: normal rate, regular rhythm, normal S1 and S2, no murmurs, rubs, clicks, or gallops, distal pulses intact, no carotid bruits  Abdomen: soft, non-tender, non-distended, normal bowel sounds, no masses or organomegaly  Extremities: no cyanosis, clubbing or edema  Musculoskeletal: normal range of motion, no joint swelling, deformity or tenderness  Neurologic: reflexes normal and symmetric, no cranial nerve deficit, gait, coordination and speech normal    Assessment/Plan:  1.  Atrial fibrillation, unspecified type ([address]  - [place] DISCHARGE MEDS RECONCILED W/ CURRENT OUTPATIENT MED LIST    Medical Decision Making: moderate complexity

## 2019-08-20 ENCOUNTER — OUTSIDE FACILITY SERVICE (OUTPATIENT)
Dept: CARDIOLOGY | Facility: CLINIC | Age: 54
End: 2019-08-20

## 2019-08-20 ENCOUNTER — HOSPITAL ENCOUNTER (OUTPATIENT)
Dept: NON INVASIVE DIAGNOSTICS | Facility: HOSPITAL | Age: 54
Discharge: HOME | End: 2019-08-20
Payer: COMMERCIAL

## 2019-08-20 PROCEDURE — 93350 STRESS TTE ONLY: CPT | Performed by: INTERNAL MEDICINE

## 2019-08-20 PROCEDURE — 93018 CV STRESS TEST I&R ONLY: CPT | Performed by: INTERNAL MEDICINE

## 2019-08-20 PROCEDURE — 93017 CV STRESS TEST TRACING ONLY: CPT

## 2019-09-16 DIAGNOSIS — I48.91 ATRIAL FIBRILLATION, UNSPECIFIED TYPE (HCC): ICD-10-CM

## 2019-09-16 RX ORDER — FLECAINIDE ACETATE 50 MG/1
25 TABLET ORAL 2 TIMES DAILY
Qty: 60 TABLET | Refills: 2 | Status: SHIPPED | OUTPATIENT
Start: 2019-09-16 | End: 2020-03-23

## 2019-09-19 ENCOUNTER — OFFICE VISIT (OUTPATIENT)
Dept: FAMILY MEDICINE CLINIC | Age: 54
End: 2019-09-19
Payer: COMMERCIAL

## 2019-09-19 VITALS
DIASTOLIC BLOOD PRESSURE: 75 MMHG | OXYGEN SATURATION: 94 % | RESPIRATION RATE: 18 BRPM | SYSTOLIC BLOOD PRESSURE: 131 MMHG | HEART RATE: 97 BPM | TEMPERATURE: 98.9 F

## 2019-09-19 DIAGNOSIS — J30.2 SEASONAL ALLERGIES: Primary | ICD-10-CM

## 2019-09-19 DIAGNOSIS — G47.9 SLEEP DISTURBANCE: ICD-10-CM

## 2019-09-19 PROCEDURE — 99213 OFFICE O/P EST LOW 20 MIN: CPT | Performed by: NURSE PRACTITIONER

## 2019-09-19 RX ORDER — MONTELUKAST SODIUM 10 MG/1
10 TABLET ORAL DAILY
Qty: 30 TABLET | Refills: 3 | Status: SHIPPED | OUTPATIENT
Start: 2019-09-19

## 2019-09-19 RX ORDER — LORATADINE 10 MG/1
10 TABLET ORAL DAILY
Qty: 30 TABLET | Refills: 3 | Status: SHIPPED | OUTPATIENT
Start: 2019-09-19 | End: 2021-11-01 | Stop reason: ALTCHOICE

## 2019-09-19 RX ORDER — OLOPATADINE HYDROCHLORIDE 1 MG/ML
1 SOLUTION/ DROPS OPHTHALMIC DAILY PRN
Qty: 2 BOTTLE | Refills: 3 | Status: SHIPPED | OUTPATIENT
Start: 2019-09-19 | End: 2019-10-19

## 2019-09-19 ASSESSMENT — ENCOUNTER SYMPTOMS
EYE ITCHING: 1
GASTROINTESTINAL NEGATIVE: 1
SORE THROAT: 1
RHINORRHEA: 1
RESPIRATORY NEGATIVE: 1

## 2019-09-19 NOTE — PATIENT INSTRUCTIONS
Education and Discharge Instructions:  Keep a list of your medicines with you at all times. Always bring a up to date list or the medication bottles when going to the doctor or hospital.   Always follow the directions on your medications unless the doctor or nurse has instructed you otherwise. Keep all medications out of reach of children. Store medicines according to the directions on the label. Seek emergency medical attention if you think you have used too much medication. A overdose can be fatal.  Don't share your medicines with anyone. It may harm them. Discard any unused or out dated medications. If you have any questions, ask your provider or pharmacist for more information. Be sure to keep all appointments for provider visits or tests. Please continue all your medications that the Provider has prescribed for you unless other [facility]    1. Mental Health Info and Treatment [phone]  2. Drug and Alcohol Detox Rehab treatment 24 hr [phone]  3. Stop Smoking Classes- [facility]-[phone]  4. [address] Program- prescription fxjegfwlng-[phone]  5. Hospice Care [phone]  6. Adult/Child Protection [phone]    [employer]: Your online connection to your health information. Download the [place]icare at [employer] ([employer]) or the [employer]	[place] ([employer]). [address] from the list of providers. [employer] Help Desk: 9-139-24-FYBGS    basno        We are committed to providing you with the best care possible. In order to help us achieve these goals please remember to bring all medications, herbal products, and over the counter supplements with you to each visit. If your provider has ordered testing for you, please be sure to follow up with our office if you have not received results within 7 days after the testing took place.      *If you receive a survey

## 2019-09-19 NOTE — PROGRESS NOTES
2019     Chan Parry (:  1965) is a 49 y.o. female, here for evaluation of the following medical concerns:    Pt presents to clinic r/t to severe allergy symptoms. Onset was around 4-5 days ago, symptoms constant and worsening, exacerbated by exposure to environmental allergens, has taken allegra and flonase qd with little improvement. PMH + for asthma and previous use of allergy shots. States that she has not had to use allergy shots for the last 3 years but given symptoms she thinks she may have to start them again. She just recently moved to [facility] from [place]. She denies sick contacts, denies fever. Insomnia-pt complains of insomnia for the past few weeks. She states that she is able to fall asleep but she is unable to stay asleep. She states that she wakes up around 12:30 amd q night. She is concerned that it may be due to her heart rate dropping while she is asleep. She has a fitness monitor that she uses to track her heart rate and sleep and wake cycles at home. She sees Dr. Catalina Gallegos (cardiologist) who has placed her on metoprolol and tambocor for heart arrythmias. She states that she used to take trazodone in the past for sleep but it stopped working and she is concerned that if she was placed back on it that it may interact with her tambocor. She has also tried melatonin with no improvement in sleep. Review of Systems   Constitutional: Negative. Negative for fatigue and fever. HENT: Positive for congestion, postnasal drip, rhinorrhea, sneezing and sore throat ( scratchy). Eyes: Positive for itching. Eye redness   Respiratory: Negative. Cardiovascular: Negative. Gastrointestinal: Negative. Endocrine: Negative. Genitourinary: Negative. Musculoskeletal: Negative. Skin: Negative. Allergic/Immunologic: Positive for environmental allergies. Neurological: Negative. Hematological: Negative.     Psychiatric/Behavioral: Positive for sleep

## 2019-09-20 DIAGNOSIS — G47.9 SLEEP DISTURBANCE: Primary | ICD-10-CM

## 2019-09-22 RX ORDER — ZOLPIDEM TARTRATE 5 MG/1
2.5 TABLET ORAL NIGHTLY PRN
Qty: 7 TABLET | Refills: 0 | Status: SHIPPED | OUTPATIENT
Start: 2019-09-22 | End: 2019-10-06

## 2019-11-26 ENCOUNTER — NURSE ONLY (OUTPATIENT)
Dept: FAMILY MEDICINE CLINIC | Age: 54
End: 2019-11-26
Payer: COMMERCIAL

## 2019-11-26 DIAGNOSIS — J30.9 ALLERGIC RHINITIS, UNSPECIFIED SEASONALITY, UNSPECIFIED TRIGGER: ICD-10-CM

## 2019-11-26 PROCEDURE — 95117 IMMUNOTHERAPY INJECTIONS: CPT | Performed by: NURSE PRACTITIONER

## 2019-12-17 ENCOUNTER — NURSE ONLY (OUTPATIENT)
Dept: FAMILY MEDICINE CLINIC | Age: 54
End: 2019-12-17
Payer: COMMERCIAL

## 2019-12-17 DIAGNOSIS — J30.89 ALLERGIC RHINITIS DUE TO OTHER ALLERGIC TRIGGER, UNSPECIFIED SEASONALITY: ICD-10-CM

## 2019-12-17 PROCEDURE — 95117 IMMUNOTHERAPY INJECTIONS: CPT | Performed by: NURSE PRACTITIONER

## 2019-12-26 ENCOUNTER — NURSE ONLY (OUTPATIENT)
Dept: FAMILY MEDICINE CLINIC | Age: 54
End: 2019-12-26
Payer: COMMERCIAL

## 2019-12-26 DIAGNOSIS — J30.89 ALLERGIC RHINITIS DUE TO OTHER ALLERGIC TRIGGER, UNSPECIFIED SEASONALITY: ICD-10-CM

## 2019-12-26 PROCEDURE — 95117 IMMUNOTHERAPY INJECTIONS: CPT | Performed by: NURSE PRACTITIONER

## 2020-01-02 ENCOUNTER — NURSE ONLY (OUTPATIENT)
Dept: FAMILY MEDICINE CLINIC | Age: 55
End: 2020-01-02
Payer: COMMERCIAL

## 2020-01-02 PROCEDURE — 95117 IMMUNOTHERAPY INJECTIONS: CPT | Performed by: NURSE PRACTITIONER

## 2020-01-08 ENCOUNTER — NURSE ONLY (OUTPATIENT)
Dept: FAMILY MEDICINE CLINIC | Age: 55
End: 2020-01-08
Payer: COMMERCIAL

## 2020-01-08 PROCEDURE — 95117 IMMUNOTHERAPY INJECTIONS: CPT | Performed by: NURSE PRACTITIONER

## 2020-01-15 ENCOUNTER — NURSE ONLY (OUTPATIENT)
Dept: FAMILY MEDICINE CLINIC | Age: 55
End: 2020-01-15
Payer: COMMERCIAL

## 2020-01-15 PROCEDURE — 95117 IMMUNOTHERAPY INJECTIONS: CPT | Performed by: NURSE PRACTITIONER

## 2020-01-15 NOTE — PROGRESS NOTES
Patient Responses    Are you pregnant or suspect pregnancy? No  Have you been diagnosed with a new medical condition? No  Have you had increased asthma symptoms (Chest tightness, increased cough, wheezing, or felt short of breath) in the past week? No  Have you had increased allergy symptoms (Itching eyes or nose, sneezing, runny nose, post-nasal drip, or throat-clearing) in the past week? No  Have you had a cold, respiratory infection, or flu-like symptoms in the past 2 weeks? No  Did you have any problems such as increased allergy or asthma symptoms, hives, or generalized itching after receiving your last injection or swelling that persisted into the next day? No  Are you on any new medications since your last allergy injection? New blood pressure or heart medications, eye drops, etc.? Please Specify: no  Do you have an Epi-Pen? Yes        Sidney Brown was given 2 allergy injections per written orders. #1 0.40 ml SC R arm  #2 0.40 ml SC L arm  No complications or reactions noted. Patient tolerated procedure well.

## 2020-01-30 ENCOUNTER — NURSE ONLY (OUTPATIENT)
Dept: FAMILY MEDICINE CLINIC | Age: 55
End: 2020-01-30
Payer: COMMERCIAL

## 2020-01-30 PROCEDURE — 95117 IMMUNOTHERAPY INJECTIONS: CPT | Performed by: NURSE PRACTITIONER

## 2020-02-06 ENCOUNTER — NURSE ONLY (OUTPATIENT)
Dept: FAMILY MEDICINE CLINIC | Age: 55
End: 2020-02-06
Payer: COMMERCIAL

## 2020-02-06 PROCEDURE — 95117 IMMUNOTHERAPY INJECTIONS: CPT | Performed by: NURSE PRACTITIONER

## 2020-02-07 ENCOUNTER — HOSPITAL ENCOUNTER (OUTPATIENT)
Facility: HOSPITAL | Age: 55
Discharge: HOME | End: 2020-02-07
Payer: COMMERCIAL

## 2020-02-07 ENCOUNTER — OFFICE VISIT (OUTPATIENT)
Dept: CARDIOLOGY | Facility: CLINIC | Age: 55
End: 2020-02-07

## 2020-02-07 VITALS
OXYGEN SATURATION: 98 % | HEART RATE: 80 BPM | RESPIRATION RATE: 18 BRPM | BODY MASS INDEX: 33.8 KG/M2 | HEIGHT: 64 IN | SYSTOLIC BLOOD PRESSURE: 124 MMHG | WEIGHT: 198 LBS | DIASTOLIC BLOOD PRESSURE: 82 MMHG

## 2020-02-07 DIAGNOSIS — I48.0 PAROXYSMAL ATRIAL FIBRILLATION (HCC): Primary | ICD-10-CM

## 2020-02-07 DIAGNOSIS — I10 ESSENTIAL HYPERTENSION: ICD-10-CM

## 2020-02-07 PROCEDURE — 99214 OFFICE O/P EST MOD 30 MIN: CPT | Performed by: INTERNAL MEDICINE

## 2020-02-07 PROCEDURE — 93005 ELECTROCARDIOGRAM TRACING: CPT

## 2020-02-07 PROCEDURE — 93000 ELECTROCARDIOGRAM COMPLETE: CPT | Performed by: INTERNAL MEDICINE

## 2020-02-07 RX ORDER — LEVOCETIRIZINE DIHYDROCHLORIDE 5 MG/1
1 TABLET, FILM COATED ORAL DAILY
COMMUNITY
Start: 2019-12-03 | End: 2023-03-03

## 2020-02-07 RX ORDER — MONTELUKAST SODIUM 10 MG/1
1 TABLET ORAL DAILY
COMMUNITY
Start: 2020-01-21

## 2020-02-07 RX ORDER — ALBUTEROL SULFATE 90 UG/1
2 AEROSOL, METERED RESPIRATORY (INHALATION) DAILY
COMMUNITY
End: 2020-02-07

## 2020-02-07 NOTE — PROGRESS NOTES
[facility] AT [address], [address]  [place], [place], [address] [phone]  WWW.[facility]           OUTPATIENT CLINIC FOLLOW UP NOTE    Patient Care Team:  Patient Care Team:  Emma Woodson APRN as PCP - General (Family Medicine)    Subjective:      Chief Complaint   Patient presents with   • Follow-up       HPI:    Caroline Harding is a 54 y.o. female.  Cardiac problem list:  1. Paroxysmal atrial fibrillation  2. Hypertension  3. Hypothyroidism    Since today for follow-up.  Since the patient was last seen she reports that she had episodes of bradycardia at nighttime in the 40s.  Her PCP decreased her flecainide to 25 mg twice daily.  She also reports that she has recently had an abnormal free T3 and had adjustments in her thyroid medication.  She did started taking the changed dose yesterday.  She does note leading up to this change she has had elevated heart rates typically in the 120s at rest.  She also reports since starting her cardiac and allergy medications back in the summer she has had developed insomnia.    Review of Systems:  Positive for tachycardia, insomnia  Negative for exertional chest pain, dyspnea with exertion, orthopnea, PND, lower extremity edema, palpitations, lightheadedness, syncope.     PFSH:  Patient Active Problem List   Diagnosis   • Paroxysmal atrial fibrillation (CMS/HCC)   • A-fib (CMS/HCC)   • Essential hypertension         Current Outpatient Medications:   •  Beclomethasone Dipropionate 80 MCG/ACT aerosol solution, 2 puffs into the nostril(s) as directed by provider., Disp: , Rfl:   •  flecainide (TAMBOCOR) 50 MG tablet, Take 1 tablet by mouth Every 12 (Twelve) Hours. (Patient taking differently: Take 25 mg by mouth Every 12 (Twelve) Hours.), Disp: 60 tablet, Rfl: 0  •  fluticasone (FLONASE) 50 MCG/ACT nasal spray, 2 sprays into the nostril(s) as directed by provider Daily., Disp: , Rfl:   •  levocetirizine (XYZAL) 5 MG  "tablet, 1 tablet Daily., Disp: , Rfl:   •  metoprolol tartrate (LOPRESSOR) 25 MG tablet, Take 0.5 tablets by mouth Every 12 (Twelve) Hours., Disp: 30 tablet, Rfl: 11  •  montelukast (SINGULAIR) 10 MG tablet, 1 tablet Daily., Disp: , Rfl:   •  Multiple Vitamins-Minerals (MULTIVITAMIN WITH MINERALS) tablet tablet, Take 1 tablet by mouth Daily., Disp: , Rfl:   •  Omega-3 Fatty Acids (FISH OIL) 1000 MG capsule capsule, Take  by mouth Daily With Breakfast., Disp: , Rfl:   •  progesterone (PROMETRIUM) 100 MG capsule, Take 150 mg by mouth Daily., Disp: , Rfl:   •  Thyroid 60 MG PO tablet, Take 60 mg by mouth 2 (Two) Times a Day. 60 mg in am and 30 mg in evening, Disp: , Rfl:     Allergies   Allergen Reactions   • Penicillins Anaphylaxis   • Gluten Meal Other (See Comments)   • Sulfa Antibiotics Hives   • Wheat Bran Other (See Comments)        reports that she has never smoked. She has never used smokeless tobacco.    Family History   Problem Relation Age of Onset   • COPD Mother    • COPD Sister          Objective:   Physical exam:  /82 (BP Location: Left arm, Patient Position: Sitting)   Pulse 80   Resp 18   Ht 162.6 cm (64\")   Wt 89.8 kg (198 lb)   SpO2 98%   BMI 33.99 kg/m²   CONSTITUTIONAL: No acute distress  RESPIRATORY: Normal effort. Clear to auscultation bilaterally without wheezing or rales.  CARDIOVASCULAR:  Regular rate and rhythm with normal S1 and S2. Without murmur, gallop or rub. Normal radial pulse. There is no lower extremity edema bilaterally.  PSYCH: Normal affect    Labs:    BUN   Date Value Ref Range Status   07/31/2019 13 7 - 20 mg/dL Final     Creatinine   Date Value Ref Range Status   07/31/2019 0.60 0.60 - 1.30 mg/dL Final     Potassium   Date Value Ref Range Status   07/31/2019 3.7 3.5 - 5.1 mmol/L Final       Lab Results   Component Value Date    CHOL 158 07/31/2019     Lab Results   Component Value Date    TRIG 131 07/31/2019     Lab Results   Component Value Date    HDL 58 " 07/31/2019     Lab Results   Component Value Date    LDL 74 07/31/2019     No components found for: LDLDIRECTC    Diagnostic Data:      ECG 12 Lead  Date/Time: 2/7/2020 11:49 AM  Performed by: Michael Fisher MD  Authorized by: Michael Fisher MD   Comparison: compared with previous ECG from 8/2/2019  Similar to previous ECG  Rhythm: sinus bradycardia  Rate: bradycardic  BPM: 58  Comments:  ms  QTc 432 ms            Assessment and Plan:   Caroline was seen today for follow-up.    Diagnoses and all orders for this visit:    Paroxysmal atrial fibrillation (CMS/HCC)  Insomnia  -Maintaining normal sinus rhythm.  -[id number] (gender) having occasional palpitations, tachycardia  -Recent thyroid issues with adjustment in medications by PCP.  -Patient is unsure if she wishes to take aspirin 81 mg daily.  We recommended that she take 81 mg daily, but left the decision up to her.  Discussed the risks and benefits especially in light of the updated 2019 A. fib guidelines, that do not clearly recommend aspirin with a low chads vascular score  -Continue flecainide and metoprolol.  Patient also reports insomnia since starting these medications as well as some allergy medications.  Patient will call us if her insomnia does not improve over the next 2 weeks with the change in her thyroid medication.  At which point, we e will switch her flecainide/metoprolol to propafenone  mg twice daily only.  -We discussed sleep hygiene, which the patient has been trying to adjust already.  She is also trying melatonin.  Strongly advised cardiac forms of exercise.  Otherwise does not have other clear sleep disturbances to warrant a sleep study at this time.    Essential hypertension  -BP as been at goal  -Continue metoprolol at current dosing for now.      - Return in about 6 months (around 8/7/2020).    Scribed for Michael Fisher MD by Hallie Coughlin, APRN   2/7/2020  12:37 PM    Michael PICHARDO MD, personally performed the  services as scribed by the above named individual. I have made any necessary edits and it is both accurate and complete.     Michael Fisher MD, MSc, [facility]  Interventional Cardiology  [facility] at [facility]

## 2020-02-19 ENCOUNTER — NURSE ONLY (OUTPATIENT)
Dept: FAMILY MEDICINE CLINIC | Age: 55
End: 2020-02-19
Payer: COMMERCIAL

## 2020-02-19 PROCEDURE — 95117 IMMUNOTHERAPY INJECTIONS: CPT | Performed by: NURSE PRACTITIONER

## 2020-02-19 NOTE — PROGRESS NOTES
Patient Responses    Are you pregnant or suspect pregnancy? No  Have you been diagnosed with a new medical condition? No  Have you had increased asthma symptoms (Chest tightness, increased cough, wheezing, or felt short of breath) in the past week? No  Have you had increased allergy symptoms (Itching eyes or nose, sneezing, runny nose, post-nasal drip, or throat-clearing) in the past week? No  Have you had a cold, respiratory infection, or flu-like symptoms in the past 2 weeks? No  Did you have any problems such as increased allergy or asthma symptoms, hives, or generalized itching after receiving your last injection or swelling that persisted into the next day? No  Are you on any new medications since your last allergy injection? New blood pressure or heart medications, eye drops, etc.? Please Specify: no  Do you have an Epi-Pen? Yes      Alexa Rojo was given 2 allergy injections per written orders. #1 0.10 ml SC R arm  #2 0.10 ml SC L arm  No complications or reactions noted. Patient tolerated procedure well.

## 2020-02-27 ENCOUNTER — NURSE ONLY (OUTPATIENT)
Dept: FAMILY MEDICINE CLINIC | Age: 55
End: 2020-02-27
Payer: COMMERCIAL

## 2020-02-27 PROCEDURE — 95117 IMMUNOTHERAPY INJECTIONS: CPT | Performed by: NURSE PRACTITIONER

## 2020-02-27 NOTE — PROGRESS NOTES
Patient Responses    Are you pregnant or suspect pregnancy? No  Have you been diagnosed with a new medical condition? No  Have you had increased asthma symptoms (Chest tightness, increased cough, wheezing, or felt short of breath) in the past week? No  Have you had increased allergy symptoms (Itching eyes or nose, sneezing, runny nose, post-nasal drip, or throat-clearing) in the past week? No  Have you had a cold, respiratory infection, or flu-like symptoms in the past 2 weeks? No  Did you have any problems such as increased allergy or asthma symptoms, hives, or generalized itching after receiving your last injection or swelling that persisted into the next day? No  Are you on any new medications since your last allergy injection? New blood pressure or heart medications, eye drops, etc.? Please Specify: no  Do you have an Epi-Pen? Yes      Mancil Slice was given 2 allergy injections per written orders. #1 0.15 ml SC L arm  #2 0.15 ml SC R arm  No complications or reactions noted. Patient tolerated procedure well.

## 2020-03-04 ENCOUNTER — NURSE ONLY (OUTPATIENT)
Dept: FAMILY MEDICINE CLINIC | Age: 55
End: 2020-03-04
Payer: COMMERCIAL

## 2020-03-04 PROCEDURE — 95117 IMMUNOTHERAPY INJECTIONS: CPT | Performed by: NURSE PRACTITIONER

## 2020-03-19 ENCOUNTER — NURSE ONLY (OUTPATIENT)
Dept: FAMILY MEDICINE CLINIC | Age: 55
End: 2020-03-19
Payer: COMMERCIAL

## 2020-03-19 PROCEDURE — 95117 IMMUNOTHERAPY INJECTIONS: CPT | Performed by: NURSE PRACTITIONER

## 2020-03-19 NOTE — PROGRESS NOTES
Patient Responses    Are you pregnant or suspect pregnancy? No  Have you been diagnosed with a new medical condition? No  Have you had increased asthma symptoms (Chest tightness, increased cough, wheezing, or felt short of breath) in the past week? No  Have you had increased allergy symptoms (Itching eyes or nose, sneezing, runny nose, post-nasal drip, or throat-clearing) in the past week? No  Have you had a cold, respiratory infection, or flu-like symptoms in the past 2 weeks? No  Did you have any problems such as increased allergy or asthma symptoms, hives, or generalized itching after receiving your last injection or swelling that persisted into the next day? No  Are you on any new medications since your last allergy injection? New blood pressure or heart medications, eye drops, etc.? Please Specify: no  Do you have an Epi-Pen? Yes         Dion Israel was given 2 allergy injections per written orders. #1 0.30 ml SC L arm  #2 0.30 ml SC R arm  No complications or reactions noted. Patient tolerated procedure well. Constance Garcia

## 2020-03-20 ENCOUNTER — TELEPHONE (OUTPATIENT)
Dept: FAMILY MEDICINE CLINIC | Age: 55
End: 2020-03-20

## 2020-03-23 RX ORDER — FLECAINIDE ACETATE 50 MG/1
TABLET ORAL
Qty: 60 TABLET | Refills: 2 | Status: SHIPPED | OUTPATIENT
Start: 2020-03-23 | End: 2021-11-01 | Stop reason: ALTCHOICE

## 2020-05-29 ENCOUNTER — TELEPHONE (OUTPATIENT)
Dept: CARDIOLOGY | Facility: CLINIC | Age: 55
End: 2020-05-29

## 2020-05-29 DIAGNOSIS — I48.0 PAROXYSMAL ATRIAL FIBRILLATION (HCC): Primary | ICD-10-CM

## 2020-05-29 RX ORDER — PROPAFENONE HYDROCHLORIDE 225 MG/1
225 CAPSULE, EXTENDED RELEASE ORAL 2 TIMES DAILY
Qty: 60 CAPSULE | Refills: 11 | Status: SHIPPED | OUTPATIENT
Start: 2020-05-29 | End: 2021-06-01

## 2020-05-29 NOTE — TELEPHONE ENCOUNTER
Okay to switch to propafenone ER 225mg BID in the following manner:    1) Day 1: Stop flecainide. Continue metoprolol   2) Day 4: Start propafenone. Stop metoprolol  3) Day 7: Repeat EKG in office

## 2020-05-29 NOTE — TELEPHONE ENCOUNTER
Patient contacted with recommendations per MJS. Pt to begin with below schedule. Will come to [place] office on June 5 for follow up EKG.

## 2020-05-29 NOTE — TELEPHONE ENCOUNTER
Patient states that she believes her metoprolol is exacerbating her sleeping issues. She recently skipped several doses of her metoprolol and her sleep improved. She is wishing to switch to propafenone  mg BID in place of flecainide/metoprolol as previously discussed.       Would she need a follow up EKG after switching to propafenone? Thanks!

## 2020-06-05 ENCOUNTER — HOSPITAL ENCOUNTER (OUTPATIENT)
Facility: HOSPITAL | Age: 55
Discharge: HOME | End: 2020-06-05
Payer: COMMERCIAL

## 2020-06-05 PROCEDURE — 93005 ELECTROCARDIOGRAM TRACING: CPT

## 2020-08-21 ENCOUNTER — HOSPITAL ENCOUNTER (OUTPATIENT)
Facility: HOSPITAL | Age: 55
Discharge: HOME | End: 2020-08-21
Payer: COMMERCIAL

## 2020-08-21 ENCOUNTER — OFFICE VISIT (OUTPATIENT)
Dept: CARDIOLOGY | Facility: CLINIC | Age: 55
End: 2020-08-21

## 2020-08-21 VITALS
HEIGHT: 64 IN | SYSTOLIC BLOOD PRESSURE: 118 MMHG | BODY MASS INDEX: 33.02 KG/M2 | HEART RATE: 73 BPM | OXYGEN SATURATION: 98 % | DIASTOLIC BLOOD PRESSURE: 74 MMHG | WEIGHT: 193.4 LBS

## 2020-08-21 DIAGNOSIS — I48.0 PAROXYSMAL ATRIAL FIBRILLATION (HCC): Primary | ICD-10-CM

## 2020-08-21 PROCEDURE — 99214 OFFICE O/P EST MOD 30 MIN: CPT | Performed by: INTERNAL MEDICINE

## 2020-08-21 PROCEDURE — 93005 ELECTROCARDIOGRAM TRACING: CPT

## 2020-08-21 PROCEDURE — 93000 ELECTROCARDIOGRAM COMPLETE: CPT | Performed by: INTERNAL MEDICINE

## 2020-08-21 NOTE — PROGRESS NOTES
[facility] AT [address], [address]  [place], [place], [address] [phone]  WWW.[facility]           OUTPATIENT CLINIC FOLLOW UP NOTE    Patient Care Team:  Patient Care Team:  Emma Woodson APRN as PCP - General (Family Medicine)    Subjective:      Chief Complaint   Patient presents with   • PAF       HPI:    Caroline Harding is a 55 y.o. female.  Cardiac problem list:  1. Paroxysmal atrial fibrillation  1. Sleeping difficulties with metoprolol.  Switch to flecainide/metoprolol to per path known 5/2020  2. Hypothyroidism    Since today for follow-up.      Since her last visit her sleep has been largely improved overall.  Still has sleep difficulties.  Had blood work for thyroid on Wednesday.  Results pending.  Palpitations are once every couple weeks, heart rate goes up to about 120 bpm, can last minutes to an hour.  Sometimes she needs to rest but it is not associated shortness of breath, chest pain.  No other shortness of breath or chest pains.  Once she took an extra dose of propafenone and this seemed to have helped.    Review of Systems:  Positive for palpitations, insomnia  Negative for exertional chest pain, dyspnea with exertion, orthopnea, PND, lower extremity edema, palpitations, lightheadedness, syncope.     PFSH:  Patient Active Problem List   Diagnosis   • Paroxysmal atrial fibrillation (CMS/HCC)   • A-fib (CMS/HCC)         Current Outpatient Medications:   •  Beclomethasone Dipropionate 80 MCG/ACT aerosol solution, 2 puffs into the nostril(s) as directed by provider., Disp: , Rfl:   •  fluticasone (FLONASE) 50 MCG/ACT nasal spray, 2 sprays into the nostril(s) as directed by provider Daily., Disp: , Rfl:   •  levocetirizine (XYZAL) 5 MG tablet, 1 tablet Daily., Disp: , Rfl:   •  montelukast (SINGULAIR) 10 MG tablet, 1 tablet Daily., Disp: , Rfl:   •  Multiple Vitamins-Minerals (MULTIVITAMIN WITH MINERALS) tablet tablet, Take 1 tablet by mouth  "Daily., Disp: , Rfl:   •  Omega-3 Fatty Acids (FISH OIL) 1000 MG capsule capsule, Take  by mouth Daily With Breakfast., Disp: , Rfl:   •  progesterone (PROMETRIUM) 100 MG capsule, Take 150 mg by mouth Daily., Disp: , Rfl:   •  propafenone SR (RYTHMOL SR) 225 MG 12 hr capsule, Take 1 capsule by mouth 2 (Two) Times a Day., Disp: 60 capsule, Rfl: 11  •  Thyroid 60 MG PO tablet, Take 60 mg by mouth 2 (Two) Times a Day. 60 mg in am and 30 mg in evening, Disp: , Rfl:     Allergies   Allergen Reactions   • Penicillins Anaphylaxis   • Gluten Meal Other (See Comments)   • Sulfa Antibiotics Hives   • Wheat Bran Other (See Comments)        reports that she has never smoked. She has never used smokeless tobacco.    Family History   Problem Relation Age of Onset   • COPD Mother    • COPD Sister          Objective:   Physical exam:  /74 (BP Location: Right arm, Patient Position: Sitting)   Pulse 73   Ht 162.6 cm (64\")   Wt 87.7 kg (193 lb 6.4 oz)   SpO2 98%   BMI 33.20 kg/m²   CONSTITUTIONAL: No acute distress  CARDIOVASCULAR:  Regular rate and rhythm with normal S1 and S2. Without murmur, gallop or rub.     Labs:    BUN   Date Value Ref Range Status   07/31/2019 13 7 - 20 mg/dL Final     Creatinine   Date Value Ref Range Status   07/31/2019 0.60 0.60 - 1.30 mg/dL Final     Potassium   Date Value Ref Range Status   07/31/2019 3.7 3.5 - 5.1 mmol/L Final       Lab Results   Component Value Date    CHOL 158 07/31/2019     Lab Results   Component Value Date    TRIG 131 07/31/2019     Lab Results   Component Value Date    HDL 58 07/31/2019     Lab Results   Component Value Date    LDL 74 07/31/2019     No components found for: LDLDIRECTC    Diagnostic Data:      ECG 12 Lead  Date/Time: 8/21/2020 10:29 AM  Performed by: Michael Fisher MD  Authorized by: Michael Fisher MD   Comparison: compared with previous ECG from 6/5/2020  Similar to previous ECG  Rhythm: sinus rhythm  Conduction: incomplete right bundle branch block  QRS " axis: left  Comments: Heart rate 66, , QTc 415            Assessment and Plan:   Caroline was seen today for follow-up.    Diagnoses and all orders for this visit:    Paroxysmal atrial fibrillation (CMS/HCC)  Insomnia  Thyroid disorder  -Maintaining normal sinus rhythm.  -Exacerbated sleeping issues with metoprolol.  Switched from flec/metoprolol to propafenone   -Continue pro-path unknown  -Discussed that it is okay to take an extra dose once in a while if her tachycardia/palpitations recur.  Currently this only happens a couple times a month.  If it becomes more frequent, I discussed the future possibility of a heart monitor and increasing her propafenone dose    -[id number]=3 (gender)   -Previously discussed taking aspirin 81 mg daily.  We recommended that she take 81 mg daily, but left the decision up to her.  Discussed the risks and benefits especially in light of the 2019 A. fib guidelines, that do not clearly recommend aspirin with a low ChadsVasc score    -Again offered a sleep referral.  She like to hold off for now since overall, her sleep has improved over the last few months.    - Return in about 6 months (around 2/21/2021) for Next scheduled follow up with an ECG.      Michael Fisher MD, MSc, FACC  Interventional Cardiology  [facility]

## 2021-04-13 ENCOUNTER — OFFICE VISIT (OUTPATIENT)
Dept: FAMILY MEDICINE CLINIC | Age: 56
End: 2021-04-13
Payer: COMMERCIAL

## 2021-04-13 VITALS
BODY MASS INDEX: 33.22 KG/M2 | WEIGHT: 194.6 LBS | HEART RATE: 73 BPM | HEIGHT: 64 IN | OXYGEN SATURATION: 98 % | TEMPERATURE: 97.6 F

## 2021-04-13 DIAGNOSIS — G89.29 CHRONIC PAIN OF RIGHT KNEE: ICD-10-CM

## 2021-04-13 DIAGNOSIS — Z12.11 SCREENING FOR COLON CANCER: Primary | ICD-10-CM

## 2021-04-13 DIAGNOSIS — M25.561 CHRONIC PAIN OF RIGHT KNEE: ICD-10-CM

## 2021-04-13 PROCEDURE — 99213 OFFICE O/P EST LOW 20 MIN: CPT | Performed by: NURSE PRACTITIONER

## 2021-04-13 RX ORDER — PROGESTERONE 100 MG/1
150 CAPSULE ORAL DAILY
COMMUNITY

## 2021-04-13 RX ORDER — PROMETHAZINE HYDROCHLORIDE 25 MG/1
1 TABLET ORAL DAILY
COMMUNITY

## 2021-04-13 RX ORDER — PROPAFENONE HYDROCHLORIDE 225 MG/1
225 CAPSULE, EXTENDED RELEASE ORAL 2 TIMES DAILY
COMMUNITY
Start: 2020-05-29 | End: 2021-11-01 | Stop reason: ALTCHOICE

## 2021-04-13 ASSESSMENT — ENCOUNTER SYMPTOMS
SHORTNESS OF BREATH: 0
VOMITING: 0
RESPIRATORY NEGATIVE: 1
NAUSEA: 0
COUGH: 0
EYES NEGATIVE: 1
ABDOMINAL PAIN: 0
GASTROINTESTINAL NEGATIVE: 1
ALLERGIC/IMMUNOLOGIC NEGATIVE: 1
DIARRHEA: 0

## 2021-04-13 ASSESSMENT — PATIENT HEALTH QUESTIONNAIRE - PHQ9
SUM OF ALL RESPONSES TO PHQ QUESTIONS 1-9: 0
SUM OF ALL RESPONSES TO PHQ9 QUESTIONS 1 & 2: 0
2. FEELING DOWN, DEPRESSED OR HOPELESS: 0
1. LITTLE INTEREST OR PLEASURE IN DOING THINGS: 0

## 2021-04-13 NOTE — PROGRESS NOTES
SUBJECTIVE:    Anam Nam is a 48 y.o. female    Pt c/o right knee pain for 1 year that is gradually worsening. Pt reports she is unable to fully extend her knee and fully bend her knee. Pain initially started after swatting down painting a porch. No injury. In December 2020, she was walking and she developed a sharp pain right knee. Pain has been worse since that time. Pt is under the care of Dr Khanh Ro- endocrinology for hypothyroidism and Dr Ann-Marie Laura- cardiology for Atrial Fib. She has a follow up with Dr Ann-Marie Laura on 4/15/2021. She recently saw endocrinology and had routine labs done. Knee Pain   The incident occurred more than 1 week ago (1 year). There was no injury mechanism (pain started after painting a porch). The pain is present in the right knee. The quality of the pain is described as stabbing and aching. The pain is at a severity of 3/10 (10/10 when the pain is the worst). The pain is moderate. The pain has been fluctuating since onset. Associated symptoms include a loss of motion. Pertinent negatives include no inability to bear weight, loss of sensation, muscle weakness, numbness or tingling. She reports no foreign bodies present. The symptoms are aggravated by weight bearing and movement. She has tried acetaminophen and NSAIDs for the symptoms. The treatment provided mild relief. Chief Complaint   Patient presents with    Knee Pain     right knee        Review of Systems   Constitutional: Negative. HENT: Negative. Eyes: Negative. Respiratory: Negative. Negative for cough and shortness of breath. Cardiovascular: Negative. Negative for chest pain. Gastrointestinal: Negative. Negative for abdominal pain, diarrhea, nausea and vomiting. Endocrine: Negative. Genitourinary: Negative. Musculoskeletal: Negative. Allergic/Immunologic: Negative. Neurological: Negative. Negative for tingling and numbness. Hematological: Negative.     Psychiatric/Behavioral: Negative. OBJECTIVE:    Pulse 73   Temp 97.6 °F (36.4 °C) (Temporal)   Ht 5' 4\" (1.626 m)   Wt 194 lb 9.6 oz (88.3 kg)   SpO2 98%   BMI 33.40 kg/m²    Physical Exam  Vitals signs and nursing note reviewed. Constitutional:       Appearance: She is well-developed. Neck:      Thyroid: No thyromegaly. Vascular: No carotid bruit. Cardiovascular:      Rate and Rhythm: Normal rate and regular rhythm. Heart sounds: Normal heart sounds. No murmur. Pulmonary:      Effort: Pulmonary effort is normal.      Breath sounds: Normal breath sounds. Skin:     General: Skin is warm and dry. Neurological:      Mental Status: She is alert and oriented to person, place, and time. Psychiatric:         Behavior: Behavior normal.         ASSESSMENT/PLAN:   Mildred Francisco was seen today for knee pain. Diagnoses and all orders for this visit:    Screening for colon cancer  -     Cologuard (For External Results Only); Future    Chronic pain of right knee  -     XR KNEE RIGHT (3 VIEWS); Future    I have recommended that this patient have a mammogram and pap smear but she declines at this time. I have discussed the risks and benefits of this examination with her. The patient verbalizes understanding. I have requested lab results from endocrinology. Return in about 1 month (around 5/13/2021) for fasting labs.     Current Outpatient Medications on File Prior to Visit   Medication Sig Dispense Refill    Multiple Vitamins-Iron TABS Take 1 tablet by mouth daily      propafenone (RYTHMOL SR) 225 MG extended release capsule Take 225 mg by mouth 2 times daily      progesterone (PROMETRIUM) 100 MG CAPS capsule Take 150 mg by mouth daily      montelukast (SINGULAIR) 10 MG tablet Take 1 tablet by mouth daily 30 tablet 3    ARMOUR THYROID 60 MG tablet Take 60 mg by mouth daily   1    fluticasone (FLONASE) 50 MCG/ACT nasal spray 1 spray by Each Nostril route daily      beclomethasone (BECONASE-AQ) 80 MCG/ACT AERS nasal

## 2021-04-13 NOTE — PROGRESS NOTES
Health Maintenance Due   Topic Date Due    Hepatitis C screen  Never done    HIV screen  Never done    COVID-19 Vaccine (1) Never done    DTaP/Tdap/Td vaccine (1 - Tdap) Never done    Cervical cancer screen  Never done    Lipid screen  Never done    Breast cancer screen  Never done    Shingles Vaccine (1 of 2) Never done    Colon cancer screen colonoscopy  Never done   Review at a later date, per patient.  has a few tests coming up and would like to get those taken care of first.    I have recommended that this patient have a mammogram and sigmoidoscopy but she declines at this time. I have discussed the risks and benefits of this examination with her. The patient verbalizes understanding.

## 2021-04-16 ENCOUNTER — HOSPITAL ENCOUNTER (OUTPATIENT)
Facility: HOSPITAL | Age: 56
Discharge: HOME | End: 2021-04-16
Payer: COMMERCIAL

## 2021-04-16 ENCOUNTER — HOSPITAL ENCOUNTER (OUTPATIENT)
Dept: GENERAL RADIOLOGY | Facility: HOSPITAL | Age: 56
Discharge: HOME | End: 2021-04-16
Payer: COMMERCIAL

## 2021-04-16 ENCOUNTER — OFFICE VISIT (OUTPATIENT)
Dept: CARDIOLOGY | Facility: CLINIC | Age: 56
End: 2021-04-16

## 2021-04-16 VITALS
BODY MASS INDEX: 33.12 KG/M2 | WEIGHT: 194 LBS | OXYGEN SATURATION: 97 % | HEIGHT: 64 IN | DIASTOLIC BLOOD PRESSURE: 90 MMHG | SYSTOLIC BLOOD PRESSURE: 138 MMHG | HEART RATE: 87 BPM

## 2021-04-16 DIAGNOSIS — M25.561 CHRONIC PAIN OF RIGHT KNEE: ICD-10-CM

## 2021-04-16 DIAGNOSIS — I48.0 PAROXYSMAL ATRIAL FIBRILLATION (HCC): Primary | ICD-10-CM

## 2021-04-16 DIAGNOSIS — G89.29 CHRONIC PAIN OF RIGHT KNEE: ICD-10-CM

## 2021-04-16 DIAGNOSIS — R01.1 CARDIAC MURMUR: ICD-10-CM

## 2021-04-16 PROCEDURE — 93000 ELECTROCARDIOGRAM COMPLETE: CPT | Performed by: INTERNAL MEDICINE

## 2021-04-16 PROCEDURE — 93005 ELECTROCARDIOGRAM TRACING: CPT

## 2021-04-16 PROCEDURE — 99214 OFFICE O/P EST MOD 30 MIN: CPT | Performed by: INTERNAL MEDICINE

## 2021-04-16 PROCEDURE — 73562 X-RAY EXAM OF KNEE 3: CPT

## 2021-04-16 NOTE — PROGRESS NOTES
[facility] AT [address], [address]  [place], [place], [address] [phone]  WWW.[facility]           OUTPATIENT CLINIC FOLLOW UP NOTE    Patient Care Team:  Patient Care Team:  Emma Woodson APRN as PCP - General (Family Medicine)    Subjective:      Chief Complaint   Patient presents with   • Atrial Fibrillation       HPI:    Caroline Harding is a 55 y.o. female.  Cardiac problem list:  1. Paroxysmal atrial fibrillation  1. Sleeping difficulties with metoprolol.  Switch to flecainide/metoprolol to propafenone 5/2020  2. Hypothyroidism    Since today for follow-up.      Since patient was last seen she reports that she has been doing well from a cardiac standpoint.  She has had rare palpitations.  She denies chest pain, shortness of breath, lightheadedness, syncope, orthopnea, or PND.  Her sleep issues have been improved since being off of metoprolol.  She also has complaints of right knee pain and is getting an x-ray done today.    Review of Systems:  Positive for palpitations, insomnia, right knee pain  Negative for exertional chest pain, dyspnea with exertion, orthopnea, PND, lower extremity edema, lightheadedness, syncope.     PFSH:  Patient Active Problem List   Diagnosis   • Paroxysmal atrial fibrillation (CMS/HCC)   • A-fib (CMS/HCC)   • Cardiac murmur         Current Outpatient Medications:   •  fluticasone (FLONASE) 50 MCG/ACT nasal spray, 2 sprays into the nostril(s) as directed by provider Daily., Disp: , Rfl:   •  montelukast (SINGULAIR) 10 MG tablet, 1 tablet Daily., Disp: , Rfl:   •  Multiple Vitamins-Minerals (MULTIVITAMIN WITH MINERALS) tablet tablet, Take 1 tablet by mouth Daily., Disp: , Rfl:   •  progesterone (PROMETRIUM) 100 MG capsule, Take 150 mg by mouth Daily., Disp: , Rfl:   •  propafenone SR (RYTHMOL SR) 225 MG 12 hr capsule, Take 1 capsule by mouth 2 (Two) Times a Day., Disp: 60 capsule, Rfl: 11  •  Thyroid 60 MG PO tablet, Take 60 mg  "by mouth 2 (Two) Times a Day. 60 mg in am and 30 mg in evening, Disp: , Rfl:   •  Beclomethasone Dipropionate 80 MCG/ACT aerosol solution, 2 puffs into the nostril(s) as directed by provider., Disp: , Rfl:   •  levocetirizine (XYZAL) 5 MG tablet, 1 tablet Daily., Disp: , Rfl:   •  Omega-3 Fatty Acids (FISH OIL) 1000 MG capsule capsule, Take  by mouth Daily With Breakfast., Disp: , Rfl:     Allergies   Allergen Reactions   • Penicillins Anaphylaxis   • Gluten Meal Other (See Comments)   • Sulfa Antibiotics Hives   • Wheat Bran Other (See Comments)        reports that she has never smoked. She has never used smokeless tobacco.    Family History   Problem Relation Age of Onset   • COPD Mother    • COPD Sister          Objective:   Physical exam:  /90   Pulse 87   Ht 162.6 cm (64\")   Wt 88 kg (194 lb)   SpO2 97%   BMI 33.30 kg/m²   CONSTITUTIONAL: No acute distress  RESPIRATORY: Normal effort. Clear to auscultation bilaterally without wheezing or rales  CARDIOVASCULAR: Regular rate and rhythm with normal S1 and S2. Without gallop or rub.  Systolic murmur at left sternal border, non radiating.  PERIPHERAL VASCULAR: Normal radial pulse. There is no lower extremity edema bilaterally.  PSYCH: Normal affect and mood    Labs:    BUN   Date Value Ref Range Status   07/31/2019 13 7 - 20 mg/dL Final     Creatinine   Date Value Ref Range Status   07/31/2019 0.60 0.60 - 1.30 mg/dL Final     Potassium   Date Value Ref Range Status   07/31/2019 3.7 3.5 - 5.1 mmol/L Final       Lab Results   Component Value Date    CHOL 158 07/31/2019     Lab Results   Component Value Date    TRIG 131 07/31/2019     Lab Results   Component Value Date    HDL 58 07/31/2019     Lab Results   Component Value Date    LDL 74 07/31/2019     No components found for: LDLDIRECTC    Diagnostic Data:      ECG 12 Lead    Date/Time: 4/16/2021 3:29 PM  Performed by: Michael Fisher MD  Authorized by: Michael Fisher MD   Comparison: compared with previous " ECG from 8/21/2020  Similar to previous ECG  Rhythm: sinus rhythm  Rate: normal  BPM: 70  Comments:  ms   ms  Left axis deviation          Results for orders placed during the hospital encounter of 07/30/19    Transthoracic Echo Complete With Contrast if Necessary Per Protocol    Interpretation Summary  · Estimated EF = 62%.  · Left ventricular systolic function is normal.  · Mild tricuspid valve regurgitation is present.  · Calculated right ventricular systolic pressure from tricuspid regurgitation is 36 mmHg.      Assessment and Plan:   Caroline was seen today for follow-up.    Diagnoses and all orders for this visit:    Paroxysmal atrial fibrillation  Insomnia  Thyroid disorder  -In normal sinus rhythm.  -Continue propafenone    -[id number]=0 (gender)   -Previously discussed taking aspirin 81 mg daily.  We recommended that she take 81 mg daily, but left the decision up to her.  Discussed the risks and benefits especially in light of the 2019 A. fib guidelines, that do not clearly recommend aspirin with a low ChadsVasc score.     Cardiac murmur  -Not previously noted by myself, but was noted on exam today.  Noted incidentally by another provider recently as well  -Based on its location and possible intermittent nature, likely representative of the patient's previously noted mild tricuspid regurgitation by echo in 2019  -If the patient's clinical status or the quality of the murmur changes, will repeat echo  -Clinical monitoring for now      - Return in about 6 months (around 10/16/2021) for Next scheduled follow up with an EKG.    Scribed for Michael Fisher MD by EDOUARD Fournier. 4/16/2021  15:55 EDT

## 2021-04-21 DIAGNOSIS — M17.11 OSTEOARTHRITIS OF RIGHT KNEE, UNSPECIFIED OSTEOARTHRITIS TYPE: Primary | ICD-10-CM

## 2021-06-01 RX ORDER — PROPAFENONE HYDROCHLORIDE 225 MG/1
CAPSULE, EXTENDED RELEASE ORAL
Qty: 180 CAPSULE | Refills: 3 | Status: SHIPPED | OUTPATIENT
Start: 2021-06-01 | End: 2022-06-03

## 2021-06-14 DIAGNOSIS — Z12.11 SCREENING FOR COLON CANCER: ICD-10-CM

## 2021-11-01 ENCOUNTER — OFFICE VISIT (OUTPATIENT)
Dept: FAMILY MEDICINE CLINIC | Age: 56
End: 2021-11-01
Payer: COMMERCIAL

## 2021-11-01 VITALS
TEMPERATURE: 97.9 F | WEIGHT: 196 LBS | OXYGEN SATURATION: 97 % | BODY MASS INDEX: 33.46 KG/M2 | RESPIRATION RATE: 18 BRPM | HEART RATE: 88 BPM | HEIGHT: 64 IN | SYSTOLIC BLOOD PRESSURE: 122 MMHG | DIASTOLIC BLOOD PRESSURE: 78 MMHG

## 2021-11-01 DIAGNOSIS — U07.1 COVID-19: Primary | ICD-10-CM

## 2021-11-01 PROCEDURE — 99213 OFFICE O/P EST LOW 20 MIN: CPT | Performed by: NURSE PRACTITIONER

## 2021-11-01 RX ORDER — AZITHROMYCIN 250 MG/1
TABLET, FILM COATED ORAL
Qty: 1 PACKET | Refills: 0 | Status: SHIPPED | OUTPATIENT
Start: 2021-11-01

## 2021-11-01 RX ORDER — FLUCONAZOLE 150 MG/1
150 TABLET ORAL ONCE
Qty: 1 TABLET | Refills: 0 | Status: SHIPPED | OUTPATIENT
Start: 2021-11-01 | End: 2021-11-01

## 2021-11-01 RX ORDER — PROPAFENONE HYDROCHLORIDE 225 MG/1
CAPSULE, EXTENDED RELEASE ORAL
COMMUNITY
Start: 2021-06-01

## 2021-11-01 RX ORDER — PREDNISONE 10 MG/1
TABLET ORAL
Qty: 1 EACH | Refills: 0 | Status: SHIPPED | OUTPATIENT
Start: 2021-11-01

## 2021-11-01 RX ORDER — BENZONATATE 200 MG/1
200 CAPSULE ORAL 3 TIMES DAILY PRN
Qty: 30 CAPSULE | Refills: 0 | Status: SHIPPED | OUTPATIENT
Start: 2021-11-01 | End: 2021-11-08

## 2021-11-01 RX ORDER — CETIRIZINE HYDROCHLORIDE 10 MG/1
10 TABLET ORAL DAILY
COMMUNITY

## 2021-11-01 RX ORDER — BECLOMETHASONE DIPROPIONATE HFA 80 UG/1
AEROSOL, METERED RESPIRATORY (INHALATION)
COMMUNITY
Start: 2021-09-27

## 2021-11-01 RX ORDER — GUAIFENESIN 600 MG/1
1200 TABLET, EXTENDED RELEASE ORAL 2 TIMES DAILY
Qty: 40 TABLET | Refills: 0 | Status: SHIPPED | OUTPATIENT
Start: 2021-11-01 | End: 2021-11-11

## 2021-11-01 SDOH — ECONOMIC STABILITY: TRANSPORTATION INSECURITY
IN THE PAST 12 MONTHS, HAS THE LACK OF TRANSPORTATION KEPT YOU FROM MEDICAL APPOINTMENTS OR FROM GETTING MEDICATIONS?: NO

## 2021-11-01 SDOH — ECONOMIC STABILITY: INCOME INSECURITY: IN THE LAST 12 MONTHS, WAS THERE A TIME WHEN YOU WERE NOT ABLE TO PAY THE MORTGAGE OR RENT ON TIME?: NO

## 2021-11-01 SDOH — ECONOMIC STABILITY: HOUSING INSECURITY
IN THE LAST 12 MONTHS, WAS THERE A TIME WHEN YOU DID NOT HAVE A STEADY PLACE TO SLEEP OR SLEPT IN A SHELTER (INCLUDING NOW)?: NO

## 2021-11-01 SDOH — ECONOMIC STABILITY: FOOD INSECURITY: WITHIN THE PAST 12 MONTHS, THE FOOD YOU BOUGHT JUST DIDN'T LAST AND YOU DIDN'T HAVE MONEY TO GET MORE.: NEVER TRUE

## 2021-11-01 SDOH — ECONOMIC STABILITY: FOOD INSECURITY: WITHIN THE PAST 12 MONTHS, YOU WORRIED THAT YOUR FOOD WOULD RUN OUT BEFORE YOU GOT MONEY TO BUY MORE.: NEVER TRUE

## 2021-11-01 SDOH — ECONOMIC STABILITY: TRANSPORTATION INSECURITY
IN THE PAST 12 MONTHS, HAS LACK OF TRANSPORTATION KEPT YOU FROM MEETINGS, WORK, OR FROM GETTING THINGS NEEDED FOR DAILY LIVING?: NO

## 2021-11-01 ASSESSMENT — ENCOUNTER SYMPTOMS
ABDOMINAL PAIN: 0
EYES NEGATIVE: 1
SORE THROAT: 1
DIARRHEA: 0
CHEST TIGHTNESS: 0
WHEEZING: 1
NAUSEA: 0
VOMITING: 0
COUGH: 1
ALLERGIC/IMMUNOLOGIC NEGATIVE: 1
SHORTNESS OF BREATH: 0
RHINORRHEA: 1

## 2021-11-01 ASSESSMENT — LIFESTYLE VARIABLES
HOW OFTEN DO YOU HAVE A DRINK CONTAINING ALCOHOL: 4 OR MORE TIMES A WEEK
HOW MANY STANDARD DRINKS CONTAINING ALCOHOL DO YOU HAVE ON A TYPICAL DAY: 1 OR 2

## 2021-11-01 ASSESSMENT — SOCIAL DETERMINANTS OF HEALTH (SDOH): HOW HARD IS IT FOR YOU TO PAY FOR THE VERY BASICS LIKE FOOD, HOUSING, MEDICAL CARE, AND HEATING?: NOT HARD AT ALL

## 2021-11-01 NOTE — PROGRESS NOTES
SUBJECTIVE:    Ramses Gipson is a 55 y.o. female    Pt presents for evaluation of cough. Pt reports history of Asthma. Pt denies shortness of breath. Pt reports she is using her incentive spirometer and is at baseline. She also has a home pulse ox monitor and spo2 is at baseline- 96-97%. Cough  This is a new problem. The current episode started in the past 7 days (4 days- started thursday). The problem has been gradually improving. The cough is productive of sputum (yellow sputum). Associated symptoms include myalgias (generalized body aches), nasal congestion, postnasal drip, rhinorrhea, a sore throat and wheezing (upper airway). Pertinent negatives include no shortness of breath. Associated symptoms comments: + fatigue, + body aches- but has been rearranging furniture. + nasal congestion. Nothing aggravates the symptoms. Her past medical history is significant for asthma (well controlled per patient). Pt reports she has been using her Incentive spirometer at baseline and pulse ox- 96-97%        Chief Complaint   Patient presents with    Fever     100    Cough     symptoms started last Thursday     Fatigue    Pharyngitis        Review of Systems   Constitutional: Positive for fatigue. Negative for activity change and appetite change. HENT: Positive for congestion (nasal congestion), postnasal drip, rhinorrhea, sneezing and sore throat. Negative for nosebleeds. Eyes: Negative. Respiratory: Positive for cough (productive cough- yellow sputum) and wheezing (upper airway). Negative for chest tightness and shortness of breath. Gastrointestinal: Negative for abdominal pain, diarrhea, nausea and vomiting. Endocrine: Negative. Musculoskeletal: Positive for myalgias (generalized body aches). Allergic/Immunologic: Negative. Neurological: Negative. Hematological: Negative. Psychiatric/Behavioral: Negative.          OBJECTIVE:    /78   Pulse 88   Temp 97.9 °F (36.6 °C)   Resp 18 Ht 5' 4\" (1.626 m)   Wt 196 lb (88.9 kg)   SpO2 97% Comment: room air  Breastfeeding No   BMI 33.64 kg/m²    Physical Exam  Vitals and nursing note reviewed. Constitutional:       Appearance: She is well-developed. Neck:      Thyroid: No thyromegaly. Vascular: No carotid bruit. Cardiovascular:      Rate and Rhythm: Normal rate and regular rhythm. Heart sounds: Normal heart sounds. No murmur heard. Pulmonary:      Effort: Pulmonary effort is normal.      Breath sounds: Normal breath sounds and air entry. No decreased breath sounds. Skin:     General: Skin is warm and dry. Neurological:      Mental Status: She is alert and oriented to person, place, and time. Psychiatric:         Mood and Affect: Mood normal.         Behavior: Behavior normal.         Thought Content: Thought content normal.         Judgment: Judgment normal.         ASSESSMENT/PLAN:   Deepthi Gonzales was seen today for fever, cough, fatigue and pharyngitis. Diagnoses and all orders for this visit:    COVID-19  -     azithromycin (ZITHROMAX) 250 MG tablet; Take 2 tabs (500 mg) on Day 1, and take 1 tab (250 mg) on days 2 through 5.  -     fluconazole (DIFLUCAN) 150 MG tablet; Take 1 tablet by mouth once for 1 dose  -     benzonatate (TESSALON) 200 MG capsule; Take 1 capsule by mouth 3 times daily as needed for Cough  -     guaiFENesin (MUCINEX) 600 MG extended release tablet; Take 2 tablets by mouth 2 times daily for 10 days    Other orders  -     predniSONE 10 MG (21) TBPK; Take as directed- 5-5-7-3-2-1-stop. - pt advised to take Prednisone if she develops wheezing due to hx of gloria. Pt has hx of asthma and Atrial fib. BMI is greater than 30. I have recommended Regen-COV infusion therapy. I have discussed with patient she is a high risk patient. Pt wishes to research monoclonal antibody infusion therapy. I have provided patient with Fact sheet for patients for Regen-COV.   Pt will call our office if she is interested in scheduling infusion. I have also advised patient to quarantine for 10 days or as directed by [facility]. Exposed family members need to quarantine as well. Pt advised to go to the ER if she develops shortness of breath or s/s of dehydration. I have also recommended patient take Vitamin C, Vitamin D, [employer]Inc and Melatonin. Return if symptoms worsen or fail to improve. Current Outpatient Medications on File Prior to Visit   Medication Sig Dispense Refill    cetirizine (ZYRTEC ALLERGY) 10 MG tablet Take 10 mg by mouth daily      QVAR REDIHALER 80 MCG/ACT AERB inhaler       propafenone (RYTHMOL SR) 225 MG extended release capsule TAKE ONE CAPSULE BY MOUTH TWO TIMES A DAY      Multiple Vitamins-Iron TABS Take 1 tablet by mouth daily      progesterone (PROMETRIUM) 100 MG CAPS capsule Take 150 mg by mouth daily      montelukast (SINGULAIR) 10 MG tablet Take 1 tablet by mouth daily 30 tablet 3    ARMOUR THYROID 60 MG tablet Take 60 mg by mouth daily   1    fluticasone (FLONASE) 50 MCG/ACT nasal spray 1 spray by Each Nostril route daily       No current facility-administered medications on file prior to visit.

## 2022-01-21 ENCOUNTER — HOSPITAL ENCOUNTER (OUTPATIENT)
Facility: HOSPITAL | Age: 57
Discharge: HOME | End: 2022-01-21
Payer: COMMERCIAL

## 2022-01-21 ENCOUNTER — OFFICE VISIT (OUTPATIENT)
Dept: CARDIOLOGY | Facility: CLINIC | Age: 57
End: 2022-01-21

## 2022-01-21 VITALS
DIASTOLIC BLOOD PRESSURE: 88 MMHG | WEIGHT: 200 LBS | HEIGHT: 64 IN | HEART RATE: 92 BPM | SYSTOLIC BLOOD PRESSURE: 140 MMHG | OXYGEN SATURATION: 96 % | BODY MASS INDEX: 34.15 KG/M2

## 2022-01-21 DIAGNOSIS — I48.0 PAROXYSMAL ATRIAL FIBRILLATION: Primary | ICD-10-CM

## 2022-01-21 DIAGNOSIS — R01.1 CARDIAC MURMUR: ICD-10-CM

## 2022-01-21 DIAGNOSIS — I10 ESSENTIAL HYPERTENSION: ICD-10-CM

## 2022-01-21 PROCEDURE — 93005 ELECTROCARDIOGRAM TRACING: CPT

## 2022-01-21 PROCEDURE — 93000 ELECTROCARDIOGRAM COMPLETE: CPT | Performed by: INTERNAL MEDICINE

## 2022-01-21 PROCEDURE — 99214 OFFICE O/P EST MOD 30 MIN: CPT | Performed by: INTERNAL MEDICINE

## 2022-06-03 RX ORDER — PROPAFENONE HYDROCHLORIDE 225 MG/1
CAPSULE, EXTENDED RELEASE ORAL
Qty: 60 CAPSULE | Refills: 3 | Status: SHIPPED | OUTPATIENT
Start: 2022-06-03 | End: 2022-10-03

## 2022-08-19 ENCOUNTER — OFFICE VISIT (OUTPATIENT)
Dept: CARDIOLOGY | Facility: CLINIC | Age: 57
End: 2022-08-19

## 2022-08-19 ENCOUNTER — HOSPITAL ENCOUNTER (OUTPATIENT)
Facility: HOSPITAL | Age: 57
Discharge: HOME | End: 2022-08-19
Payer: COMMERCIAL

## 2022-08-19 VITALS
BODY MASS INDEX: 34.38 KG/M2 | HEIGHT: 64 IN | HEART RATE: 78 BPM | OXYGEN SATURATION: 97 % | WEIGHT: 201.4 LBS | DIASTOLIC BLOOD PRESSURE: 88 MMHG | SYSTOLIC BLOOD PRESSURE: 124 MMHG

## 2022-08-19 DIAGNOSIS — I48.0 PAROXYSMAL ATRIAL FIBRILLATION: Primary | ICD-10-CM

## 2022-08-19 DIAGNOSIS — I10 ESSENTIAL HYPERTENSION: ICD-10-CM

## 2022-08-19 PROCEDURE — 99214 OFFICE O/P EST MOD 30 MIN: CPT | Performed by: INTERNAL MEDICINE

## 2022-08-19 PROCEDURE — 93010 ELECTROCARDIOGRAM REPORT: CPT | Performed by: INTERNAL MEDICINE

## 2022-08-19 PROCEDURE — 93005 ELECTROCARDIOGRAM TRACING: CPT

## 2022-08-19 RX ORDER — AZELASTINE 1 MG/ML
2 SPRAY, METERED NASAL 2 TIMES DAILY
COMMUNITY

## 2022-08-19 RX ORDER — IPRATROPIUM BROMIDE 42 UG/1
2 SPRAY, METERED NASAL 4 TIMES DAILY
COMMUNITY
End: 2023-03-03

## 2022-08-19 NOTE — PROGRESS NOTES
[facility] AT [address], [address]  [place], [place], [address] [phone]  [facility]           OUTPATIENT CLINIC FOLLOW UP NOTE    Patient Care Team:  Patient Care Team:  Emma Woodson APRN as PCP - General (Family Medicine)    Subjective:      Chief Complaint   Patient presents with   • paroxysmal atrial fabrilation   • Edema   • Rapid Heart Rate       HPI:    Caroline Harding is a 57 y.o. female.  Cardiac problem list:  1. Paroxysmal atrial fibrillation  1. Sleep issues with metoprolol  2. Switched flecainide/metoprolol to propafenone 5/2020  2. Cardiac murmur  1. Noted by cardiology 4/2021, echo in 2019 with mild TR  3. Hypothyroidism  4. Osteoarthritis  5. Asthma  6. Dependent swelling    Since today for follow-up.      Since her last visit the patient has had expedition.  Over the last 8 weeks she has been awoken at night with palpitations sometimes with a heart rate to the 130s.  Less commonly in the daytime.  Thyroid level has been low.  Has not made a change to her thyroid medication yet.  Has persistent sleep difficulties.    Review of Systems:  As noted in the HPI    PFSH:  Patient Active Problem List   Diagnosis   • Paroxysmal atrial fibrillation (HCC)   • A-fib (HCC)   • Cardiac murmur         Current Outpatient Medications:   •  azelastine (ASTELIN) 0.1 % nasal spray, 2 sprays into the nostril(s) as directed by provider 2 (Two) Times a Day. Use in each nostril as directed, Disp: , Rfl:   •  fluticasone (FLONASE) 50 MCG/ACT nasal spray, 2 sprays into the nostril(s) as directed by provider Daily., Disp: , Rfl:   •  ipratropium (ATROVENT) 0.06 % nasal spray, 2 sprays into the nostril(s) as directed by provider 4 (Four) Times a Day., Disp: , Rfl:   •  levocetirizine (XYZAL) 5 MG tablet, 1 tablet Daily., Disp: , Rfl:   •  montelukast (SINGULAIR) 10 MG tablet, 1 tablet Daily., Disp: , Rfl:   •  Multiple Vitamins-Minerals (MULTIVITAMIN WITH  "MINERALS) tablet tablet, Take 1 tablet by mouth Daily., Disp: , Rfl:   •  progesterone (PROMETRIUM) 100 MG capsule, Take 150 mg by mouth Daily., Disp: , Rfl:   •  propafenone SR (RYTHMOL SR) 225 MG 12 hr capsule, TAKE ONE CAPSULE BY MOUTH TWO TIMES A DAY, Disp: 60 capsule, Rfl: 3  •  Thyroid 60 MG PO tablet, Take 60 mg by mouth 2 (Two) Times a Day. 60 mg in am and 30 mg in evening, Disp: , Rfl:     Allergies   Allergen Reactions   • Penicillins Anaphylaxis   • Gluten Meal Other (See Comments)   • Sulfa Antibiotics Hives   • Wheat Bran Other (See Comments)        reports that she has never smoked. She has never used smokeless tobacco.    Family History   Problem Relation Age of Onset   • COPD Mother    • COPD Sister          Objective:   Physical exam:  /88 (BP Location: Left arm, Patient Position: Sitting)   Pulse 78   Ht 162.6 cm (64\")   Wt 91.4 kg (201 lb 6.4 oz)   SpO2 97%   BMI 34.57 kg/m²   CONSTITUTIONAL: No acute distress  RESPIRATORY: Normal effort. Clear to auscultation bilaterally without wheezing or rales  CARDIOVASCULAR: No carotid bruit bilaterally.  Regular rate and rhythm with normal S1 and S2. Without gallop or rub.  Systolic murmur at left sternal border not heard today  PERIPHERAL VASCULAR: Normal radial pulse.    Labs:    BUN   Date Value Ref Range Status   07/31/2019 13 7 - 20 mg/dL Final     Creatinine   Date Value Ref Range Status   07/31/2019 0.60 0.60 - 1.30 mg/dL Final     Potassium   Date Value Ref Range Status   07/31/2019 3.7 3.5 - 5.1 mmol/L Final       Lab Results   Component Value Date    CHOL 158 07/31/2019     Lab Results   Component Value Date    TRIG 131 07/31/2019     Lab Results   Component Value Date    HDL 58 07/31/2019     Lab Results   Component Value Date    LDL 74 07/31/2019     No components found for: LDLDIRECTC    Diagnostic Data:      ECG 12 Lead    Date/Time: 8/19/2022 2:27 PM  Performed by: Michael Fisher MD  Authorized by: Michael Fisher MD   Comparison: " compared with previous ECG from 1/21/2022  Similar to previous ECG  Rhythm: sinus rhythm          Results for orders placed during the hospital encounter of 07/30/19    Transthoracic Echo Complete With Contrast if Necessary Per Protocol    Interpretation Summary  · Estimated EF = 62%.  · Left ventricular systolic function is normal.  · Mild tricuspid valve regurgitation is present.  · Calculated right ventricular systolic pressure from tricuspid regurgitation is 36 mmHg.      Assessment and Plan:     Paroxysmal atrial fibrillation  Insomnia  Thyroid disorder  -In normal sinus rhythm.  -Continue propafenone SR 25 mg twice daily  -Adding Lopressor 25 mg daily as needed for increased palpitations  -Patient to call us if in 6 to 8 weeks she still having issues.  At which point would increase propafenone SR to 300 mg twice a day and repeat an EKG after 5 doses    -[id number] (gender)   -Previously discussed taking aspirin 81 mg daily.  We recommended that she take 81 mg daily, but left the decision up to her.  Discussed the risks and benefits especially in light of the 2019 A. fib guidelines, that do not clearly recommend aspirin with a low ChadsVasc score.     -Recommend correction of thyroid levels.  -Discussed evaluation by sleep medicine.  Patient wishes to hold off.  Discussed thinking about sleep hygiene and different factors that influence the quality of her sleep    Soft cardiac murmur, possible flow murmur  -Noted by us and by another provider in the spring 2021  -Based on its location and intermittent nature, likely representative of patient's previously noted mild tricuspid regurgitation by echo in 2019  -If the patient's clinical status or the quality of the murmur changes, will repeat echo  -Clinical monitoring for now      - Return in about 6 months (around 2/19/2023) for Next scheduled follow-up with an ECG.    Michael Fisher MD, MSc, FACC, [facility]  Interventional Cardiology  [facility]  [place]

## 2022-10-03 RX ORDER — PROPAFENONE HYDROCHLORIDE 225 MG/1
CAPSULE, EXTENDED RELEASE ORAL
Qty: 60 CAPSULE | Refills: 3 | Status: SHIPPED | OUTPATIENT
Start: 2022-10-03 | End: 2023-02-01

## 2022-11-29 ENCOUNTER — OFFICE VISIT (OUTPATIENT)
Dept: FAMILY MEDICINE CLINIC | Age: 57
End: 2022-11-29
Payer: COMMERCIAL

## 2022-11-29 VITALS
WEIGHT: 200.31 LBS | OXYGEN SATURATION: 98 % | DIASTOLIC BLOOD PRESSURE: 88 MMHG | HEART RATE: 62 BPM | TEMPERATURE: 97.2 F | HEIGHT: 64 IN | SYSTOLIC BLOOD PRESSURE: 131 MMHG | BODY MASS INDEX: 34.2 KG/M2

## 2022-11-29 DIAGNOSIS — R05.1 ACUTE COUGH: Primary | ICD-10-CM

## 2022-11-29 DIAGNOSIS — J20.9 ACUTE BRONCHITIS, UNSPECIFIED ORGANISM: ICD-10-CM

## 2022-11-29 DIAGNOSIS — Z87.09 HISTORY OF ASTHMA: ICD-10-CM

## 2022-11-29 DIAGNOSIS — R50.9 FEVER, UNSPECIFIED FEVER CAUSE: ICD-10-CM

## 2022-11-29 PROCEDURE — 99213 OFFICE O/P EST LOW 20 MIN: CPT | Performed by: NURSE PRACTITIONER

## 2022-11-29 RX ORDER — AZITHROMYCIN 250 MG/1
250 TABLET, FILM COATED ORAL SEE ADMIN INSTRUCTIONS
Qty: 6 TABLET | Refills: 0 | Status: SHIPPED | OUTPATIENT
Start: 2022-11-29 | End: 2022-12-04

## 2022-11-29 RX ORDER — PREDNISONE 10 MG/1
TABLET ORAL
Qty: 1 EACH | Refills: 0 | Status: SHIPPED | OUTPATIENT
Start: 2022-11-29

## 2022-11-29 SDOH — ECONOMIC STABILITY: FOOD INSECURITY: WITHIN THE PAST 12 MONTHS, THE FOOD YOU BOUGHT JUST DIDN'T LAST AND YOU DIDN'T HAVE MONEY TO GET MORE.: NEVER TRUE

## 2022-11-29 SDOH — ECONOMIC STABILITY: FOOD INSECURITY: WITHIN THE PAST 12 MONTHS, YOU WORRIED THAT YOUR FOOD WOULD RUN OUT BEFORE YOU GOT MONEY TO BUY MORE.: NEVER TRUE

## 2022-11-29 ASSESSMENT — ENCOUNTER SYMPTOMS
SHORTNESS OF BREATH: 1
SORE THROAT: 0
COUGH: 1
CHEST TIGHTNESS: 0

## 2022-11-29 ASSESSMENT — PATIENT HEALTH QUESTIONNAIRE - PHQ9
1. LITTLE INTEREST OR PLEASURE IN DOING THINGS: 0
SUM OF ALL RESPONSES TO PHQ QUESTIONS 1-9: 0
SUM OF ALL RESPONSES TO PHQ QUESTIONS 1-9: 0
2. FEELING DOWN, DEPRESSED OR HOPELESS: 0
SUM OF ALL RESPONSES TO PHQ QUESTIONS 1-9: 0
SUM OF ALL RESPONSES TO PHQ9 QUESTIONS 1 & 2: 0
SUM OF ALL RESPONSES TO PHQ QUESTIONS 1-9: 0

## 2022-11-29 ASSESSMENT — SOCIAL DETERMINANTS OF HEALTH (SDOH): HOW HARD IS IT FOR YOU TO PAY FOR THE VERY BASICS LIKE FOOD, HOUSING, MEDICAL CARE, AND HEATING?: NOT HARD AT ALL

## 2022-11-29 ASSESSMENT — ANXIETY QUESTIONNAIRES
5. BEING SO RESTLESS THAT IT IS HARD TO SIT STILL: 1
7. FEELING AFRAID AS IF SOMETHING AWFUL MIGHT HAPPEN: 0
GAD7 TOTAL SCORE: 3
3. WORRYING TOO MUCH ABOUT DIFFERENT THINGS: 0
4. TROUBLE RELAXING: 0
1. FEELING NERVOUS, ANXIOUS, OR ON EDGE: 1
6. BECOMING EASILY ANNOYED OR IRRITABLE: 0
2. NOT BEING ABLE TO STOP OR CONTROL WORRYING: 1
IF YOU CHECKED OFF ANY PROBLEMS ON THIS QUESTIONNAIRE, HOW DIFFICULT HAVE THESE PROBLEMS MADE IT FOR YOU TO DO YOUR WORK, TAKE CARE OF THINGS AT HOME, OR GET ALONG WITH OTHER PEOPLE: NOT DIFFICULT AT ALL

## 2022-11-29 NOTE — PATIENT INSTRUCTIONS
medicine. An overdose of any prescription medicine can be fatal. Overdose symptoms may include extreme drowsiness, muscle weakness, confusion, cold and clammy skin, pinpoint pupils, shallow breathing, slow heart rate, fainting, or coma. Don't share prescription medicines with others, even when they seem to have the same symptoms. What may be good for you may be harmful to others. If you are no longer taking a prescribed medication and you have pills left please take your pills out of their original containers. Mix crushed pills with an undesirable substance, such as cat litter or used coffee grounds. Put the mixture into a disposable container with a lid, such as an empty margarine tub, or into a sealable bag. Cover up or remove any of your personal information on the empty containers by covering it with black permanent marker or duct tape. Place the sealed container with the mixture, and the empty drug containers, in the trash. If you use a medication that is in the form of a patch, dispose of used patches by folding them in half so that the sticky sides meet, and then flushing them down a toilet. They should not be placed in the household trash where children or pets can find them. If you have any questions, ask your provider or pharmacist for more information. Be sure to keep all appointments for provider visits or tests.

## 2022-11-29 NOTE — PROGRESS NOTES
SUBJECTIVE:    Mary Ann Verduzco is a 65 y.o. female    Patient presents with c/o an acute illness. Patient says she has been ill since last Wednesday 11/23/2022. Symptoms started with body aches, possible fever and headaches. She now c/o low grade fever/ body aches and chills, SOB sitting,lying and up moving around as well. Pt feels SOB is worse due to coughing. She does feel symptoms have moderately improved today. She states \"my lungs are still tight, generalized body aches and fatigue\". She states she's having sweats so bad at night she had to change gowns 4-5 times per night. She reports she has even been sweating during the day. She is experiencing lung tightness, nausea and a headache as well. Last night she states she had to sleep elevated. Denies edema. Pt report the last time she had similar symptoms she had \"walking Pneumonia. \" Pt reports she has a pulse ox monitor at home and her Pulse ox has been 94-99%, HR     Pt goes to [facility] for routine labs annually and hormone replacement therapy. PCP is ELBA Fermin. She is also under the care of Dr Greta Bruner for A.fib.  pt reports HR has been well controlled. Denies chest pain or palpitations     Cough  This is a new problem. The current episode started in the past 7 days (mild cough- 5 days worse with activity). The cough is Non-productive (dry cough). Associated symptoms include chills, ear pain (right ear pressure), a fever, headaches and shortness of breath. Pertinent negatives include no chest pain, postnasal drip or sore throat. Associated symptoms comments: + nausea, no vomiting. Treatments tried: tessalon perles. The treatment provided moderate relief. Chief Complaint   Patient presents with    Cough    Fever    Sweats    Nausea    Headache    Generalized Body Aches        Review of Systems   Constitutional:  Positive for appetite change, chills, diaphoresis, fatigue and fever. HENT:  Positive for ear pain (right ear pressure).  Negative for postnasal drip, sneezing and sore throat. Respiratory:  Positive for cough and shortness of breath. Negative for chest tightness. Cardiovascular:  Negative for chest pain, palpitations and leg swelling. Endocrine: Negative for cold intolerance, heat intolerance, polydipsia, polyphagia and polyuria. Musculoskeletal: Negative. Generalized bodyaches   Skin: Negative. Neurological:  Positive for headaches. OBJECTIVE:    /88 (Site: Left Upper Arm, Position: Sitting, Cuff Size: Large Adult)   Pulse 62   Temp 97.2 °F (36.2 °C) (Infrared)   Ht 5' 3.5\" (1.613 m)   Wt 200 lb 5 oz (90.9 kg)   SpO2 98% Comment: RA  BMI 34.93 kg/m²    Physical Exam  Vitals and nursing note reviewed. Constitutional:       Appearance: Normal appearance. She is well-developed. HENT:      Head: Normocephalic. Right Ear: Tympanic membrane, ear canal and external ear normal.      Left Ear: Ear canal and external ear normal. Tympanic membrane is erythematous (mild). Nose: Mucosal edema present. No rhinorrhea. Right Sinus: No maxillary sinus tenderness or frontal sinus tenderness. Left Sinus: No maxillary sinus tenderness or frontal sinus tenderness. Mouth/Throat:      Pharynx: Uvula midline. No oropharyngeal exudate or posterior oropharyngeal erythema. Eyes:      Extraocular Movements: Extraocular movements intact. Conjunctiva/sclera: Conjunctivae normal.      Pupils: Pupils are equal, round, and reactive to light. Neck:      Thyroid: No thyromegaly. Vascular: No carotid bruit. Cardiovascular:      Rate and Rhythm: Normal rate and regular rhythm. Heart sounds: Normal heart sounds. No murmur heard. Pulmonary:      Effort: Pulmonary effort is normal.      Breath sounds: Normal breath sounds. Lymphadenopathy:      Head:      Right side of head: No submental, submandibular, tonsillar, preauricular, posterior auricular or occipital adenopathy.       Left side of head: No submental, submandibular, tonsillar, preauricular, posterior auricular or occipital adenopathy. Cervical: No cervical adenopathy. Skin:     General: Skin is warm and dry. Neurological:      Mental Status: She is alert and oriented to person, place, and time. Psychiatric:         Attention and Perception: Attention and perception normal.         Mood and Affect: Mood and affect normal.         Behavior: Behavior normal.         Thought Content: Thought content normal.         Judgment: Judgment normal.       ASSESSMENT/PLAN:   Hollie Garcia was seen today for cough, fever, sweats, nausea, headache and generalized body aches. Diagnoses and all orders for this visit:    Acute cough- symptoms are improving.    -     azithromycin (ZITHROMAX) 250 MG tablet; Take 1 tablet by mouth See Admin Instructions for 5 days 500mg on day 1 followed by 250mg on days 2 - 5  -     XR CHEST (2 VW); Future  -     predniSONE 10 MG (21) TBPK; Take as directed- 3-9-3-3-2-1-stop. -GO to the ER if symptoms worsen. Increase fluids, rest, alternate tylenol/motrin PRN for pain /fever. History of asthma  -     XR CHEST (2 VW); Future  -     predniSONE 10 MG (21) TBPK; Take as directed- 1-9-1-3-2-1-stop. Fever, unspecified fever cause- most likely viral. Due to hx of pneumonia and asthma, start azithromycin as directed. -     azithromycin (ZITHROMAX) 250 MG tablet; Take 1 tablet by mouth See Admin Instructions for 5 days 500mg on day 1 followed by 250mg on days 2 - 5  -     XR CHEST (2 VW); Future      Return if symptoms worsen or fail to improve.     Current Outpatient Medications on File Prior to Visit   Medication Sig Dispense Refill    cetirizine (ZYRTEC) 10 MG tablet Take 10 mg by mouth daily      QVAR REDIHALER 80 MCG/ACT AERB inhaler       propafenone (RYTHMOL SR) 225 MG extended release capsule TAKE ONE CAPSULE BY MOUTH TWO TIMES A DAY      Multiple Vitamins-Iron TABS Take 1 tablet by mouth daily      progesterone (PROMETRIUM) 100 MG CAPS capsule Take 150 mg by mouth daily      montelukast (SINGULAIR) 10 MG tablet Take 1 tablet by mouth daily 30 tablet 3    ARMOUR THYROID 60 MG tablet Take 60 mg by mouth daily   1    fluticasone (FLONASE) 50 MCG/ACT nasal spray 1 spray by Each Nostril route daily       No current facility-administered medications on file prior to visit.

## 2022-11-29 NOTE — PROGRESS NOTES
Chief Complaint   Patient presents with    Cough    Fever    Sweats    Nausea    Headache    Generalized Body Aches     Patient to the clinic for acute illness. Patient says she has been ill since last Wednesday and isn't getting any relief. Patient c/o low grade fever/ body aches and chills, SOB sitting,lying and up moving around as well. She states she's having sweats so bad at night she had to change gowns 4-5 times. She is experiencing lung tightness, nausea and a headache as well. Last night she states she had to sleep elevated. Have you seen any other physician or provider since your last visit no    Have you had any other diagnostic tests since your last visit? no    Have you changed or stopped any medications since your last visit? no     I have recommended that this patient have a mammogram but she declines at this time. I have discussed the risks and benefits of this examination with her. The patient verbalizes understanding. Diabetic retinal exam completed this year?  No                       * If yes please have patient sign a records release to obtain record to update Health Maintenance                       * If no, please order referral for patient to be scheduled

## 2023-02-01 RX ORDER — PROPAFENONE HYDROCHLORIDE 225 MG/1
CAPSULE, EXTENDED RELEASE ORAL
Qty: 60 CAPSULE | Refills: 3 | Status: SHIPPED | OUTPATIENT
Start: 2023-02-01

## 2023-03-03 ENCOUNTER — OFFICE VISIT (OUTPATIENT)
Dept: CARDIOLOGY | Facility: CLINIC | Age: 58
End: 2023-03-03
Payer: COMMERCIAL

## 2023-03-03 ENCOUNTER — HOSPITAL ENCOUNTER (OUTPATIENT)
Facility: HOSPITAL | Age: 58
Discharge: HOME | End: 2023-03-03
Payer: COMMERCIAL

## 2023-03-03 VITALS
HEIGHT: 64 IN | DIASTOLIC BLOOD PRESSURE: 84 MMHG | SYSTOLIC BLOOD PRESSURE: 152 MMHG | HEART RATE: 92 BPM | BODY MASS INDEX: 34.31 KG/M2 | WEIGHT: 201 LBS | OXYGEN SATURATION: 95 %

## 2023-03-03 DIAGNOSIS — R01.1 CARDIAC MURMUR: ICD-10-CM

## 2023-03-03 DIAGNOSIS — I48.0 PAROXYSMAL ATRIAL FIBRILLATION: Primary | ICD-10-CM

## 2023-03-03 PROCEDURE — 93010 ELECTROCARDIOGRAM REPORT: CPT | Performed by: INTERNAL MEDICINE

## 2023-03-03 PROCEDURE — 99213 OFFICE O/P EST LOW 20 MIN: CPT | Performed by: INTERNAL MEDICINE

## 2023-03-03 PROCEDURE — 93005 ELECTROCARDIOGRAM TRACING: CPT

## 2023-03-03 RX ORDER — BECLOMETHASONE DIPROPIONATE HFA 80 UG/1
AEROSOL, METERED RESPIRATORY (INHALATION)
COMMUNITY
Start: 2023-02-14

## 2023-03-03 RX ORDER — FOLIC ACID 0.8 MG
500 TABLET ORAL DAILY
COMMUNITY

## 2023-03-03 NOTE — PROGRESS NOTES
[facility] AT [address], [address]  [place], [place], [address] [phone]  WWW.[facility]           OUTPATIENT CLINIC FOLLOW UP NOTE    Patient Care Team:  Patient Care Team:  Sandra Stanley DO as PCP - General (Family Medicine)  Michael Fisher MD as Consulting Physician (Cardiology)    Subjective:      Chief Complaint   Patient presents with   • Atrial Fibrillation       HPI:    Caroline Harding is a 57 y.o. female.  Cardiac problem list:  1. Paroxysmal atrial fibrillation  1. CHADsVASc score of 1   2. Sleep issues with metoprolol  3. Switched flecainide/metoprolol to propafenone 5/2020  2. Cardiac murmur  1. Noted by cardiology 4/2021, echo in 2019 with mild TR  3. Hypothyroidism  4. Osteoarthritis  5. Asthma  6. Dependent swelling    Since today for follow-up.      Patient has been doing well from a cardiac standpoint since her last visit. Palpitations have been well controlled.  She is taking as needed metoprolol only about 2 to 3 times a month. Denies chest pain, shortness of breath, lower extremity edema, lightheadedness or syncope.      Review of Systems:  As noted in the HPI    PFSH:  Patient Active Problem List   Diagnosis   • Paroxysmal atrial fibrillation (HCC)   • A-fib (HCC)   • Cardiac murmur         Current Outpatient Medications:   •  azelastine (ASTELIN) 0.1 % nasal spray, 2 sprays into the nostril(s) as directed by provider 2 (Two) Times a Day. Use in each nostril as directed, Disp: , Rfl:   •  B Complex Vitamins (B COMPLEX 1 PO), Take  by mouth., Disp: , Rfl:   •  fluticasone (FLONASE) 50 MCG/ACT nasal spray, 2 sprays into the nostril(s) as directed by provider Daily., Disp: , Rfl:   •  Magnesium 500 MG capsule, Take 500 mg by mouth Daily. 3 tabs daily, Disp: , Rfl:   •  metoprolol tartrate (LOPRESSOR) 25 MG tablet, Take 1 tablet by mouth Daily As Needed (palpitations)., Disp: 60 tablet, Rfl: 11  •  montelukast (SINGULAIR) 10 MG tablet, 1  "tablet Daily., Disp: , Rfl:   •  Multiple Vitamins-Minerals (MULTIVITAMIN WITH MINERALS) tablet tablet, Take 1 tablet by mouth Daily., Disp: , Rfl:   •  Omega-3 Fatty Acids (FISH OIL EXTRA STRENGTH PO), Take  by mouth., Disp: , Rfl:   •  progesterone (PROMETRIUM) 100 MG capsule, Take 150 mg by mouth Daily., Disp: , Rfl:   •  propafenone SR (RYTHMOL SR) 225 MG 12 hr capsule, TAKE ONE CAPSULE BY MOUTH TWO TIMES A DAY, Disp: 60 capsule, Rfl: 3  •  Qvar RediHaler 80 MCG/ACT inhaler, , Disp: , Rfl:   •  Thyroid 60 MG PO tablet, Take 1 tablet by mouth Daily. 60 mg in am and 30 mg in evening, Disp: , Rfl:   •  TURMERIC CURCUMIN PO, Take 1,000 mg by mouth Daily., Disp: , Rfl:     Allergies   Allergen Reactions   • Penicillins Anaphylaxis   • Gluten Meal Other (See Comments)   • Sulfa Antibiotics Hives   • Wheat Bran Other (See Comments)        reports that she has never smoked. She has never used smokeless tobacco.    Family History   Problem Relation Age of Onset   • COPD Mother    • COPD Sister          Objective:   Physical exam:  /84 (BP Location: Left arm, Patient Position: Sitting)   Pulse 92   Ht 162.6 cm (64\")   Wt 91.2 kg (201 lb)   SpO2 95%   BMI 34.50 kg/m²   CONSTITUTIONAL: No acute distress  RESPIRATORY: Normal effort. Clear to auscultation bilaterally without wheezing or rales  CARDIOVASCULAR: No carotid bruit bilaterally.  Regular rate and rhythm with normal S1 and S2. Without gallop or rub.  Systolic murmur at left sternal border not heard today  PERIPHERAL VASCULAR: Normal radial pulse.    Labs:    BUN   Date Value Ref Range Status   07/31/2019 13 7 - 20 mg/dL Final     Creatinine   Date Value Ref Range Status   07/31/2019 0.60 0.60 - 1.30 mg/dL Final     Potassium   Date Value Ref Range Status   07/31/2019 3.7 3.5 - 5.1 mmol/L Final       Lab Results   Component Value Date    CHOL 158 07/31/2019     Lab Results   Component Value Date    TRIG 131 07/31/2019     Lab Results   Component Value Date "    HDL 58 07/31/2019     Lab Results   Component Value Date    LDL 74 07/31/2019     No components found for: LDLDIRECTC    Diagnostic Data:      ECG 12 Lead    Date/Time: 3/3/2023 3:09 PM  Performed by: Michael Fisher MD  Authorized by: Michael Fisher MD   Comparison: compared with previous ECG from 8/19/2022  Similar to previous ECG  Rhythm: sinus rhythm  Rate: normal  BPM: 86  Conduction: left anterior fascicular block  Other: no other findings            Results for orders placed during the hospital encounter of 07/30/19    Transthoracic Echo Complete With Contrast if Necessary Per Protocol    Interpretation Summary  · Estimated EF = 62%.  · Left ventricular systolic function is normal.  · Mild tricuspid valve regurgitation is present.  · Calculated right ventricular systolic pressure from tricuspid regurgitation is 36 mmHg.      Assessment and Plan:     Paroxysmal atrial fibrillation  Insomnia  Thyroid disorder  -EKG today with normal sinus rhythm.  -Continue propafenone  mg twice daily  -Continue Lopressor 25 mg daily as needed for increased palpitations    -[id number]=0 (gender)   -Previously discussed taking aspirin 81 mg daily.  We recommended that she take 81 mg daily, but left the decision up to her.  Discussed the risks and benefits especially in light of the 2019 A. fib guidelines, that do not clearly recommend aspirin with a low ChadsVasc score.     -Patient following with PCP for thyroid levels.   -Previously discussed evaluation by sleep medicine but patient wished to hold off.     Soft cardiac murmur, possible flow murmur  -Noted by us and by another provider in the spring 2021  -Based on its location and intermittent nature, likely representative of patient's previously noted mild tricuspid regurgitation by echo in 2019  -If the patient's clinical status or the quality of the murmur changes, will repeat echo  -Clinical monitoring for now      - Return in about 6 months (around 9/3/2023) for  Next scheduled follow up with EKG .      Electronically signed by EDOUARD Roger, 03/03/23, 3:12 PM EST.

## 2023-05-16 RX ORDER — PROPAFENONE HYDROCHLORIDE 225 MG/1
CAPSULE, EXTENDED RELEASE ORAL
Qty: 60 CAPSULE | Refills: 3 | Status: SHIPPED | OUTPATIENT
Start: 2023-05-16

## 2023-07-24 ENCOUNTER — TELEPHONE (OUTPATIENT)
Dept: PRIMARY CARE CLINIC | Age: 58
End: 2023-07-24

## 2023-10-09 RX ORDER — PROPAFENONE HYDROCHLORIDE 225 MG/1
CAPSULE, EXTENDED RELEASE ORAL
Qty: 60 CAPSULE | Refills: 3 | Status: SHIPPED | OUTPATIENT
Start: 2023-10-09

## 2023-10-20 ENCOUNTER — OFFICE VISIT (OUTPATIENT)
Dept: CARDIOLOGY | Facility: CLINIC | Age: 58
End: 2023-10-20
Payer: COMMERCIAL

## 2023-10-20 ENCOUNTER — HOSPITAL ENCOUNTER (OUTPATIENT)
Facility: HOSPITAL | Age: 58
Discharge: HOME | End: 2023-10-20
Payer: COMMERCIAL

## 2023-10-20 VITALS
BODY MASS INDEX: 34.49 KG/M2 | HEART RATE: 63 BPM | WEIGHT: 202 LBS | OXYGEN SATURATION: 93 % | HEIGHT: 64 IN | SYSTOLIC BLOOD PRESSURE: 128 MMHG | DIASTOLIC BLOOD PRESSURE: 72 MMHG

## 2023-10-20 DIAGNOSIS — R01.1 CARDIAC MURMUR: ICD-10-CM

## 2023-10-20 DIAGNOSIS — I48.0 PAROXYSMAL ATRIAL FIBRILLATION: Primary | ICD-10-CM

## 2023-10-20 PROCEDURE — 93000 ELECTROCARDIOGRAM COMPLETE: CPT | Performed by: INTERNAL MEDICINE

## 2023-10-20 PROCEDURE — 99214 OFFICE O/P EST MOD 30 MIN: CPT | Performed by: INTERNAL MEDICINE

## 2023-10-20 PROCEDURE — 93005 ELECTROCARDIOGRAM TRACING: CPT

## 2023-10-20 RX ORDER — FEXOFENADINE HCL 180 MG/1
180 TABLET ORAL 2 TIMES DAILY
COMMUNITY

## 2023-10-20 RX ORDER — CETIRIZINE HYDROCHLORIDE 10 MG/1
10 TABLET ORAL 2 TIMES DAILY
COMMUNITY

## 2023-10-20 RX ORDER — SODIUM PHOSPHATE,MONO-DIBASIC 19G-7G/118
ENEMA (ML) RECTAL
COMMUNITY

## 2023-10-20 RX ORDER — PROPAFENONE HYDROCHLORIDE 325 MG/1
325 CAPSULE, EXTENDED RELEASE ORAL 2 TIMES DAILY
Qty: 180 CAPSULE | Refills: 1 | Status: SHIPPED | OUTPATIENT
Start: 2023-10-20

## 2023-10-20 RX ORDER — CEPHRADINE 500 MG
200 CAPSULE ORAL 3 TIMES DAILY
COMMUNITY

## 2023-10-20 NOTE — PROGRESS NOTES
[facility] AT [address], [address]  [place], [place], [address] [phone]  WWW.[facility]           OUTPATIENT CLINIC FOLLOW UP NOTE    Patient Care Team:  Patient Care Team:  Sandra Stanley DO as PCP - General (Family Medicine)  Michael Fisher MD as Consulting Physician (Cardiology)    Subjective:      Chief Complaint   Patient presents with    Paroxysmal atrial fibrillation       HPI:    Caroline Harding is a 58 y.o. female.  Cardiac problem list:  Paroxysmal atrial fibrillation  CHADsVASc score of 1   Sleep issues with metoprolol  Switched flecainide/metoprolol to propafenone 5/2020  Cardiac murmur  Noted by cardiology 4/2021, echo in 2019 with mild TR  Hypothyroidism  Osteoarthritis  Asthma  Dependent swelling    Since today for follow-up.      Increased palpitations lately.  Taking metoprolol as needed more frequently.  Denies chest pain.  Has a cough when she has palpitations.  Ongoing right lower extremity tendinitis    Review of Systems:  As noted in the HPI    PFSH:  Patient Active Problem List   Diagnosis    Paroxysmal atrial fibrillation    A-fib    Cardiac murmur         Current Outpatient Medications:     Alpha-Lipoic Acid 200 MG capsule, Take 200 mg by mouth 3 times a day., Disp: , Rfl:     azelastine (ASTELIN) 0.1 % nasal spray, 2 sprays into the nostril(s) as directed by provider 2 (Two) Times a Day. Use in each nostril as directed, Disp: , Rfl:     B Complex Vitamins (B COMPLEX 1 PO), Take  by mouth., Disp: , Rfl:     CALCIUM PO, Take  by mouth Daily., Disp: , Rfl:     cetirizine (zyrTEC) 10 MG tablet, Take 1 tablet by mouth 2 (Two) Times a Day., Disp: , Rfl:     COLLAGEN PO, Take 3,000 mg by mouth 2 (Two) Times a Day., Disp: , Rfl:     fexofenadine (ALLEGRA) 180 MG tablet, Take 1 tablet by mouth 2 (Two) Times a Day., Disp: , Rfl:     fluticasone (FLONASE) 50 MCG/ACT nasal spray, 2 sprays into the nostril(s) as directed by provider Daily.,  "Disp: , Rfl:     glucosamine-chondroitin 500-400 MG capsule capsule, Take  by mouth 3 (Three) Times a Day With Meals., Disp: , Rfl:     Magnesium 500 MG capsule, Take 500 mg by mouth Daily. 3 tabs daily, Disp: , Rfl:     metoprolol tartrate (LOPRESSOR) 25 MG tablet, Take 1 tablet by mouth Daily As Needed (palpitations)., Disp: 60 tablet, Rfl: 11    montelukast (SINGULAIR) 10 MG tablet, 1 tablet Daily., Disp: , Rfl:     Multiple Vitamins-Minerals (MULTIVITAMIN WITH MINERALS) tablet tablet, Take 1 tablet by mouth Daily., Disp: , Rfl:     Omega-3 Fatty Acids (FISH OIL EXTRA STRENGTH PO), Take  by mouth., Disp: , Rfl:     progesterone (PROMETRIUM) 100 MG capsule, Take 150 mg by mouth Daily. Take 175 mg daily, Disp: , Rfl:     propafenone SR (RYTHMOL SR) 225 MG 12 hr capsule, TAKE ONE CAPSULE BY MOUTH TWO TIMES A DAY, Disp: 60 capsule, Rfl: 3    Qvar RediHaler 80 MCG/ACT inhaler, , Disp: , Rfl:     Thyroid 60 MG PO tablet, Take 1 tablet by mouth Daily. 60 mg in am and 30 mg in evening, Disp: , Rfl:     TURMERIC CURCUMIN PO, Take 1,000 mg by mouth Daily., Disp: , Rfl:     VITAMIN D PO, Take  by mouth Daily., Disp: , Rfl:     Allergies   Allergen Reactions    Penicillins Anaphylaxis    Gluten Meal Other (See Comments)    Sulfa Antibiotics Hives    Wheat Bran Other (See Comments)        reports that she has never smoked. She has never used smokeless tobacco.      Objective:   Physical exam:  /72 (BP Location: Left arm, Patient Position: Sitting)   Pulse 63   Ht 162.6 cm (64\")   Wt 91.6 kg (202 lb)   SpO2 93%   BMI 34.67 kg/m²   CONSTITUTIONAL: No acute distress  CARDIOVASCULAR: Regular rate and rhythm with normal S1 and S2. Without gallop or rub.  Systolic murmur at left sternal border not heard today  PERIPHERAL VASCULAR: Normal radial pulse.  No carotid bruit    Labs:    Lab Results   Component Value Date    CHOL 158 07/31/2019     Lab Results   Component Value Date    TRIG 131 07/31/2019     Lab Results " "  Component Value Date    HDL 58 07/31/2019     Lab Results   Component Value Date    LDL 74 07/31/2019     No components found for: \"LDLDIRECTC\"    Diagnostic Data:      ECG 12 Lead    Date/Time: 10/20/2023 3:23 PM  Performed by: Michael Fisher MD    Authorized by: Michael Fisher MD  Comparison: compared with previous ECG from 3/3/2023  Similar to previous ECG  Rhythm: sinus rhythm          Results for orders placed during the hospital encounter of 07/30/19    Transthoracic Echo Complete With Contrast if Necessary Per Protocol    Interpretation Summary  · Estimated EF = 62%.  · Left ventricular systolic function is normal.  · Mild tricuspid valve regurgitation is present.  · Calculated right ventricular systolic pressure from tricuspid regurgitation is 36 mmHg.      Assessment and Plan:     Paroxysmal atrial fibrillation  Insomnia  Thyroid disorder  -Increase propafenone SR to 325 mg twice daily.  -Repeat EKG after 5 doses.  Order form provided  -Continue Lopressor 25 mg daily as needed for increased palpitations    -[id number] (gender)      -Patient following with PCP for thyroid levels.   -Previously discussed evaluation by sleep medicine but patient wished to hold off.     Soft cardiac murmur, possible flow murmur  -Noted by us and by another provider in the spring 2021.  Now only heard on occasion  -Based on its location and intermittent nature, likely representative of patient's previously noted mild tricuspid regurgitation by echo in 2019  -If the patient's clinical status or the quality of the murmur changes, will repeat echo  -Clinical monitoring for now      - Return in about 6 months (around 4/20/2024) for Next scheduled follow-up with an ECG.        "

## 2023-10-31 ENCOUNTER — NURSE ONLY (OUTPATIENT)
Dept: FAMILY MEDICINE CLINIC | Age: 58
End: 2023-10-31

## 2023-10-31 DIAGNOSIS — I48.0 PAROXYSMAL A-FIB (HCC): Primary | ICD-10-CM

## 2023-11-02 ENCOUNTER — TELEPHONE (OUTPATIENT)
Dept: CARDIOLOGY | Facility: CLINIC | Age: 58
End: 2023-11-02
Payer: COMMERCIAL

## 2023-11-02 NOTE — TELEPHONE ENCOUNTER
----- Message from Michael Fisher MD sent at 11/2/2023 10:44 AM EDT -----  Please let the patient know I reviewed her EKG.  It looks stable/good

## 2023-11-07 ENCOUNTER — OFFICE VISIT (OUTPATIENT)
Dept: FAMILY MEDICINE CLINIC | Age: 58
End: 2023-11-07
Payer: COMMERCIAL

## 2023-11-07 VITALS
TEMPERATURE: 97.9 F | DIASTOLIC BLOOD PRESSURE: 80 MMHG | SYSTOLIC BLOOD PRESSURE: 132 MMHG | RESPIRATION RATE: 18 BRPM | HEIGHT: 64 IN | OXYGEN SATURATION: 97 % | WEIGHT: 202.9 LBS | BODY MASS INDEX: 34.64 KG/M2 | HEART RATE: 81 BPM

## 2023-11-07 DIAGNOSIS — J02.0 STREP PHARYNGITIS: ICD-10-CM

## 2023-11-07 DIAGNOSIS — T36.95XA ANTIBIOTIC-INDUCED YEAST INFECTION: ICD-10-CM

## 2023-11-07 DIAGNOSIS — B37.9 ANTIBIOTIC-INDUCED YEAST INFECTION: ICD-10-CM

## 2023-11-07 DIAGNOSIS — J02.9 SORE THROAT: Primary | ICD-10-CM

## 2023-11-07 LAB — S PYO AG THROAT QL: POSITIVE

## 2023-11-07 PROCEDURE — 99213 OFFICE O/P EST LOW 20 MIN: CPT | Performed by: NURSE PRACTITIONER

## 2023-11-07 RX ORDER — PROPAFENONE HYDROCHLORIDE 325 MG/1
325 CAPSULE, EXTENDED RELEASE ORAL 2 TIMES DAILY
COMMUNITY

## 2023-11-07 RX ORDER — FLUCONAZOLE 150 MG/1
150 TABLET ORAL
Qty: 2 TABLET | Refills: 0 | Status: SHIPPED | OUTPATIENT
Start: 2023-11-07 | End: 2023-11-13

## 2023-11-07 RX ORDER — AZITHROMYCIN 250 MG/1
250 TABLET, FILM COATED ORAL SEE ADMIN INSTRUCTIONS
Qty: 6 TABLET | Refills: 0 | Status: SHIPPED | OUTPATIENT
Start: 2023-11-07 | End: 2023-11-12

## 2023-11-07 ASSESSMENT — PATIENT HEALTH QUESTIONNAIRE - PHQ9
2. FEELING DOWN, DEPRESSED OR HOPELESS: 0
SUM OF ALL RESPONSES TO PHQ QUESTIONS 1-9: 0
SUM OF ALL RESPONSES TO PHQ QUESTIONS 1-9: 0
1. LITTLE INTEREST OR PLEASURE IN DOING THINGS: 0
SUM OF ALL RESPONSES TO PHQ9 QUESTIONS 1 & 2: 0
SUM OF ALL RESPONSES TO PHQ QUESTIONS 1-9: 0
SUM OF ALL RESPONSES TO PHQ QUESTIONS 1-9: 0

## 2023-11-07 ASSESSMENT — ENCOUNTER SYMPTOMS
VOMITING: 0
ABDOMINAL PAIN: 0
SORE THROAT: 1

## 2023-11-07 NOTE — PROGRESS NOTES
Chief Complaint   Patient presents with    Sore Throat     Patient here for complaints of sore throat and body aches. Patient reports she started getting a sore throat last night but was worse today. Patient reports her grandchild was strep positive yesterday.

## 2023-12-05 ENCOUNTER — OFFICE VISIT (OUTPATIENT)
Dept: FAMILY MEDICINE CLINIC | Age: 58
End: 2023-12-05
Payer: COMMERCIAL

## 2023-12-05 VITALS
WEIGHT: 202 LBS | HEART RATE: 52 BPM | OXYGEN SATURATION: 99 % | TEMPERATURE: 98 F | BODY MASS INDEX: 35.22 KG/M2 | RESPIRATION RATE: 18 BRPM

## 2023-12-05 DIAGNOSIS — L29.9 ITCHING: ICD-10-CM

## 2023-12-05 DIAGNOSIS — B86 SCABIES: Primary | ICD-10-CM

## 2023-12-05 PROCEDURE — 99213 OFFICE O/P EST LOW 20 MIN: CPT | Performed by: NURSE PRACTITIONER

## 2023-12-05 RX ORDER — PERMETHRIN 50 MG/G
CREAM TOPICAL
Qty: 1 EACH | Refills: 0 | Status: SHIPPED | OUTPATIENT
Start: 2023-12-05

## 2023-12-05 RX ORDER — PREDNISONE 10 MG/1
10 TABLET ORAL DAILY
Qty: 21 EACH | Refills: 0 | Status: SHIPPED | OUTPATIENT
Start: 2023-12-05

## 2023-12-05 ASSESSMENT — ENCOUNTER SYMPTOMS
DIARRHEA: 0
NAUSEA: 0
VOMITING: 0

## 2023-12-05 NOTE — PROGRESS NOTES
Chief Complaint   Patient presents with    Rash     2 weeks         Have you seen any other physician or provider since your last visit no    Have you had any other diagnostic tests since your last visit? no    Have you changed or stopped any medications since your last visit? no     Pt complains of rash for 2 weeks.

## 2023-12-14 ENCOUNTER — OFFICE VISIT (OUTPATIENT)
Dept: FAMILY MEDICINE CLINIC | Age: 58
End: 2023-12-14
Payer: COMMERCIAL

## 2023-12-14 VITALS
HEART RATE: 78 BPM | TEMPERATURE: 98 F | OXYGEN SATURATION: 99 % | DIASTOLIC BLOOD PRESSURE: 78 MMHG | WEIGHT: 199 LBS | SYSTOLIC BLOOD PRESSURE: 128 MMHG | BODY MASS INDEX: 34.7 KG/M2 | RESPIRATION RATE: 18 BRPM

## 2023-12-14 DIAGNOSIS — J06.9 VIRAL URI: ICD-10-CM

## 2023-12-14 DIAGNOSIS — R05.8 OTHER COUGH: ICD-10-CM

## 2023-12-14 DIAGNOSIS — J02.9 SORE THROAT: Primary | ICD-10-CM

## 2023-12-14 PROCEDURE — 99213 OFFICE O/P EST LOW 20 MIN: CPT | Performed by: NURSE PRACTITIONER

## 2023-12-14 RX ORDER — BENZONATATE 100 MG/1
100 CAPSULE ORAL 3 TIMES DAILY PRN
Qty: 30 CAPSULE | Refills: 0 | Status: SHIPPED | OUTPATIENT
Start: 2023-12-14 | End: 2023-12-24

## 2023-12-14 SDOH — ECONOMIC STABILITY: FOOD INSECURITY: WITHIN THE PAST 12 MONTHS, YOU WORRIED THAT YOUR FOOD WOULD RUN OUT BEFORE YOU GOT MONEY TO BUY MORE.: NEVER TRUE

## 2023-12-14 SDOH — ECONOMIC STABILITY: FOOD INSECURITY: WITHIN THE PAST 12 MONTHS, THE FOOD YOU BOUGHT JUST DIDN'T LAST AND YOU DIDN'T HAVE MONEY TO GET MORE.: NEVER TRUE

## 2023-12-14 SDOH — ECONOMIC STABILITY: INCOME INSECURITY: HOW HARD IS IT FOR YOU TO PAY FOR THE VERY BASICS LIKE FOOD, HOUSING, MEDICAL CARE, AND HEATING?: NOT HARD AT ALL

## 2023-12-14 NOTE — PROGRESS NOTES
Chief Complaint   Patient presents with    Pharyngitis     2 days. Have you seen any other physician or provider since your last visit no    Have you had any other diagnostic tests since your last visit? no    Have you changed or stopped any medications since your last visit? no     Pt is here today for a sore throat stated for 2 days.  Grandchildren currently have strep
MD

## 2024-04-26 RX ORDER — PROPAFENONE HYDROCHLORIDE 325 MG/1
325 CAPSULE, EXTENDED RELEASE ORAL 2 TIMES DAILY
Qty: 60 CAPSULE | Refills: 1 | Status: SHIPPED | OUTPATIENT
Start: 2024-04-26

## 2024-05-03 ENCOUNTER — HOSPITAL ENCOUNTER (OUTPATIENT)
Facility: HOSPITAL | Age: 59
Discharge: HOME | End: 2024-05-03
Payer: COMMERCIAL

## 2024-05-03 ENCOUNTER — PATIENT ROUNDING (BHMG ONLY) (OUTPATIENT)
Dept: CARDIOLOGY | Facility: CLINIC | Age: 59
End: 2024-05-03
Payer: COMMERCIAL

## 2024-05-03 ENCOUNTER — OFFICE VISIT (OUTPATIENT)
Dept: CARDIOLOGY | Facility: CLINIC | Age: 59
End: 2024-05-03
Payer: COMMERCIAL

## 2024-05-03 VITALS
WEIGHT: 200 LBS | DIASTOLIC BLOOD PRESSURE: 70 MMHG | OXYGEN SATURATION: 95 % | HEART RATE: 60 BPM | HEIGHT: 63 IN | BODY MASS INDEX: 35.44 KG/M2 | SYSTOLIC BLOOD PRESSURE: 128 MMHG

## 2024-05-03 DIAGNOSIS — I48.0 PAROXYSMAL ATRIAL FIBRILLATION: Primary | ICD-10-CM

## 2024-05-03 DIAGNOSIS — R01.1 CARDIAC MURMUR: ICD-10-CM

## 2024-05-03 PROCEDURE — 99214 OFFICE O/P EST MOD 30 MIN: CPT | Performed by: INTERNAL MEDICINE

## 2024-05-03 PROCEDURE — 93005 ELECTROCARDIOGRAM TRACING: CPT

## 2024-05-03 PROCEDURE — 93000 ELECTROCARDIOGRAM COMPLETE: CPT | Performed by: INTERNAL MEDICINE

## 2024-05-03 NOTE — PROGRESS NOTES
May 3, 2024    Hello, may I speak with Caroline Harding?    My name is BENNETT KENDALL      I am  with [facility]  [address] [address] [phone].    Before we get started may I verify your date of birth? 1965    I am calling to officially welcome you to our practice and ask about your recent visit. Is this a good time to talk? yes    Tell me about your visit with us. What things went well?  EVERYTHING EXCEPT THE OFFICE WAS HOT       We're always looking for ways to make our patients' experiences even better. Do you have recommendations on ways we may improve?  no    Overall were you satisfied with your first visit to our practice? yes       I appreciate you taking the time to speak with me today. Is there anything else I can do for you? no      Thank you, and have a great day.

## 2024-05-03 NOTE — PROGRESS NOTES
[facility] AT [address], [address]  [place], [place], [address] [phone]  [email or web address]           OUTPATIENT CLINIC FOLLOW UP NOTE    Patient Care Team:  Patient Care Team:  Sandar Stanley DO as PCP - General (Family Medicine)  Michael Fisher MD as Consulting Physician (Cardiology)    Subjective:      Chief Complaint   Patient presents with   • Paroxysmal atrial fibrillation       HPI:    Caroline Harding is a 58 y.o. female.  Cardiac problem list:  Paroxysmal atrial fibrillation  CHADsVASc score of 1   Sleep issues with metoprolol  Switched flecainide/metoprolol to propafenone 5/2020  Cardiac murmur  Noted by cardiology 4/2021, echo in 2019 with mild TR  Hypothyroidism  Osteoarthritis  Asthma  Dependent swelling    Since today for follow-up.      Increased propafenone dose has helped.  Still with intermittent fleeting palpitations lasting seconds.    Review of Systems:  As noted in the HPI    PFSH:  Patient Active Problem List   Diagnosis   • Paroxysmal atrial fibrillation   • A-fib   • Cardiac murmur         Current Outpatient Medications:   •  Alpha-Lipoic Acid 200 MG capsule, Take 200 mg by mouth 3 times a day., Disp: , Rfl:   •  azelastine (ASTELIN) 0.1 % nasal spray, 2 sprays into the nostril(s) as directed by provider 2 (Two) Times a Day. Use in each nostril as directed as needed, Disp: , Rfl:   •  B Complex Vitamins (B COMPLEX 1 PO), Take  by mouth., Disp: , Rfl:   •  CALCIUM PO, Take  by mouth Daily., Disp: , Rfl:   •  cetirizine (zyrTEC) 10 MG tablet, Take 1 tablet by mouth 2 (Two) Times a Day., Disp: , Rfl:   •  COLLAGEN PO, Take 3,000 mg by mouth 2 (Two) Times a Day., Disp: , Rfl:   •  fexofenadine (ALLEGRA) 180 MG tablet, Take 1 tablet by mouth 2 (Two) Times a Day., Disp: , Rfl:   •  fluticasone (FLONASE) 50 MCG/ACT nasal spray, 2 sprays into the nostril(s) as directed by provider Daily., Disp: , Rfl:   •  glucosamine-chondroitin 500-400 MG  "capsule capsule, Take  by mouth 3 (Three) Times a Day With Meals., Disp: , Rfl:   •  Magnesium 500 MG capsule, Take 500 mg by mouth Daily. 3 tabs daily, Disp: , Rfl:   •  metoprolol tartrate (LOPRESSOR) 25 MG tablet, TAKE 1 TABLET BY MOUTH DAILY AS NEEDED (PALPITATIONS)., Disp: 60 tablet, Rfl: 11  •  montelukast (SINGULAIR) 10 MG tablet, 1 tablet Daily., Disp: , Rfl:   •  Multiple Vitamins-Minerals (MULTIVITAMIN WITH MINERALS) tablet tablet, Take 1 tablet by mouth Daily., Disp: , Rfl:   •  Omega-3 Fatty Acids (FISH OIL EXTRA STRENGTH PO), Take  by mouth., Disp: , Rfl:   •  progesterone (PROMETRIUM) 100 MG capsule, Take 150 mg by mouth Daily. Take 175 mg daily, Disp: , Rfl:   •  propafenone SR (RYTHMOL SR) 325 MG 12 hr capsule, TAKE 1 CAPSULE BY MOUTH 2 (TWO) TIMES A DAY., Disp: 60 capsule, Rfl: 1  •  Qvar RediHaler 80 MCG/ACT inhaler, , Disp: , Rfl:   •  Thyroid 60 MG PO tablet, Take 1 tablet by mouth Daily. 60 mg in am and 30 mg in evening, Disp: , Rfl:   •  TURMERIC CURCUMIN PO, Take 1,000 mg by mouth Daily., Disp: , Rfl:   •  VITAMIN D PO, Take  by mouth Daily., Disp: , Rfl:     Allergies   Allergen Reactions   • Penicillins Anaphylaxis   • Gluten Meal Other (See Comments)   • Sulfa Antibiotics Hives   • Wheat Other (See Comments)        reports that she has never smoked. She has never used smokeless tobacco.      Objective:   Physical exam:  /70 (BP Location: Left arm, Patient Position: Sitting)   Pulse 60   Ht 160 cm (63\")   Wt 90.7 kg (200 lb)   SpO2 95%   BMI 35.43 kg/m²   CONSTITUTIONAL: No acute distress  CARDIOVASCULAR: Regular rate and rhythm with normal S1 and S2. Systolic murmur at sternal border heard today, soft, nonradiating  PERIPHERAL VASCULAR: Normal radial pulse.  No carotid bruit    Labs:    Lab Results   Component Value Date    CHOL 158 07/31/2019     Lab Results   Component Value Date    TRIG 131 07/31/2019     Lab Results   Component Value Date    HDL 58 07/31/2019     Lab Results " "  Component Value Date    LDL 74 07/31/2019     No components found for: \"LDLDIRECTC\"    Diagnostic Data:      ECG 12 Lead    Date/Time: 5/3/2024 3:04 PM  Performed by: Michael Fisher MD    Authorized by: Michael Fisher MD  Comparison: compared with previous ECG from 10/20/2023  Similar to previous ECG  Comparison to previous ECG: PVC present  Rhythm: sinus rhythm        Results for orders placed during the hospital encounter of 07/30/19    Transthoracic Echo Complete With Contrast if Necessary Per Protocol    Interpretation Summary  · Estimated EF = 62%.  · Left ventricular systolic function is normal.  · Mild tricuspid valve regurgitation is present.  · Calculated right ventricular systolic pressure from tricuspid regurgitation is 36 mmHg.      Assessment and Plan:     Paroxysmal atrial fibrillation  Insomnia  Thyroid disorder  -Continue propafenone SR at 325 mg twice daily.  -Continue Lopressor 25 mg daily as needed for increased palpitations    -[id number] (gender)      -Patient following with PCP for thyroid levels.   -Previously discussed evaluation by sleep medicine but patient wished to hold off.     -Again discussed increased exercise/activity.  Has joint difficulties.  Encouraged recumbent activities/exercises    Soft cardiac murmur, possible flow murmur  -Noted by us and by another provider in the spring 2021.  Now only heard on occasion  -Based on its location and intermittent nature, likely representative of patient's previously noted mild tricuspid regurgitation by echo in 2019  -If the patient's clinical status or the quality of the murmur changes, will repeat echo  -Clinical monitoring for now      - Return in about 6 months (around 11/3/2024) for Next scheduled follow-up with an ECG.        "

## 2024-06-05 ENCOUNTER — OFFICE VISIT (OUTPATIENT)
Dept: FAMILY MEDICINE CLINIC | Age: 59
End: 2024-06-05
Payer: COMMERCIAL

## 2024-06-05 VITALS
BODY MASS INDEX: 35.43 KG/M2 | HEART RATE: 67 BPM | WEIGHT: 203.2 LBS | DIASTOLIC BLOOD PRESSURE: 78 MMHG | OXYGEN SATURATION: 97 % | TEMPERATURE: 97.8 F | SYSTOLIC BLOOD PRESSURE: 126 MMHG

## 2024-06-05 DIAGNOSIS — H65.92 MIDDLE EAR EFFUSION, LEFT: Primary | ICD-10-CM

## 2024-06-05 DIAGNOSIS — B37.9 YEAST INFECTION: ICD-10-CM

## 2024-06-05 PROCEDURE — 99213 OFFICE O/P EST LOW 20 MIN: CPT | Performed by: NURSE PRACTITIONER

## 2024-06-05 RX ORDER — PREDNISONE 5 MG/1
5 TABLET ORAL DAILY
Qty: 21 EACH | Refills: 0 | Status: SHIPPED | OUTPATIENT
Start: 2024-06-05 | End: 2024-06-05

## 2024-06-05 RX ORDER — VIT C/B6/B5/MAGNESIUM/HERB 173 50-5-6-5MG
CAPSULE ORAL DAILY
COMMUNITY

## 2024-06-05 RX ORDER — VITAMIN B COMPLEX
1 CAPSULE ORAL DAILY
COMMUNITY

## 2024-06-05 RX ORDER — CALCIUM CARBONATE 500(1250)
500 TABLET ORAL DAILY
COMMUNITY

## 2024-06-05 RX ORDER — FLUCONAZOLE 150 MG/1
150 TABLET ORAL
Qty: 3 TABLET | Refills: 0 | Status: SHIPPED | OUTPATIENT
Start: 2024-06-05 | End: 2024-06-05

## 2024-06-05 RX ORDER — PREDNISONE 5 MG/1
5 TABLET ORAL DAILY
Qty: 21 EACH | Refills: 0 | Status: SHIPPED | OUTPATIENT
Start: 2024-06-05

## 2024-06-05 RX ORDER — MAGNESIUM 30 MG
30 TABLET ORAL 2 TIMES DAILY
COMMUNITY

## 2024-06-05 RX ORDER — LORATADINE 10 MG/1
10 CAPSULE, LIQUID FILLED ORAL EVERY MORNING
COMMUNITY

## 2024-06-05 RX ORDER — OMEGA-3S/DHA/EPA/FISH OIL/D3 300MG-1000
400 CAPSULE ORAL DAILY
COMMUNITY

## 2024-06-05 RX ORDER — FEXOFENADINE HCL 180 MG/1
180 TABLET ORAL 2 TIMES DAILY
COMMUNITY

## 2024-06-05 RX ORDER — FLUCONAZOLE 150 MG/1
150 TABLET ORAL
Qty: 3 TABLET | Refills: 0 | Status: SHIPPED | OUTPATIENT
Start: 2024-06-05 | End: 2024-06-14

## 2024-06-05 ASSESSMENT — PATIENT HEALTH QUESTIONNAIRE - PHQ9
SUM OF ALL RESPONSES TO PHQ QUESTIONS 1-9: 0
SUM OF ALL RESPONSES TO PHQ QUESTIONS 1-9: 0
SUM OF ALL RESPONSES TO PHQ9 QUESTIONS 1 & 2: 0
2. FEELING DOWN, DEPRESSED OR HOPELESS: NOT AT ALL
SUM OF ALL RESPONSES TO PHQ QUESTIONS 1-9: 0
1. LITTLE INTEREST OR PLEASURE IN DOING THINGS: NOT AT ALL
SUM OF ALL RESPONSES TO PHQ QUESTIONS 1-9: 0

## 2024-06-05 ASSESSMENT — ENCOUNTER SYMPTOMS
SHORTNESS OF BREATH: 0
RHINORRHEA: 1
COUGH: 0

## 2024-06-05 NOTE — PROGRESS NOTES
Chief Complaint   Patient presents with    Otalgia     right     Patient reports that she has been experiencing symptoms for the past couple of days.    Health Maintenance has not been reviewed during this visit.

## 2024-06-05 NOTE — PROGRESS NOTES
SUBJECTIVE:    Magdalena Jacobs is a 59 y.o. female    Patient here for right ear pain and fullness. She reports decreased hearing in that ear. No fever, chills, nausea, vomiting, headache. Tylenol and ibuprofen both helped relieve the pain.     Otalgia   There is pain in the right ear. This is a chronic problem. The current episode started yesterday. There has been no fever. The pain is mild. Associated symptoms include hearing loss (decreased) and rhinorrhea (chronic). Pertinent negatives include no coughing or ear discharge. She has tried acetaminophen and NSAIDs for the symptoms. The treatment provided significant relief. Her past medical history is significant for a chronic ear infection.        Chief Complaint   Patient presents with    Otalgia     right        Review of Systems   Constitutional:  Negative for chills, fatigue and fever.   HENT:  Positive for congestion, ear pain, hearing loss (decreased) and rhinorrhea (chronic). Negative for ear discharge.    Respiratory:  Negative for cough and shortness of breath.    All other systems reviewed and are negative.       OBJECTIVE:    /78 (Site: Right Upper Arm, Position: Sitting, Cuff Size: Medium Adult)   Pulse 67   Temp 97.8 °F (36.6 °C) (Infrared)   Wt 92.2 kg (203 lb 3.2 oz)   SpO2 97% Comment: RA  BMI 35.43 kg/m²    Physical Exam  Vitals and nursing note reviewed.   Constitutional:       Appearance: She is not ill-appearing.   HENT:      Right Ear: A middle ear effusion is present.      Left Ear: Tympanic membrane normal.   Cardiovascular:      Rate and Rhythm: Normal rate.   Pulmonary:      Effort: Pulmonary effort is normal.   Abdominal:      General: Bowel sounds are normal.      Tenderness: There is no abdominal tenderness.   Skin:     General: Skin is warm and dry.   Neurological:      Mental Status: She is alert.         ASSESSMENT/PLAN:   1. Middle ear effusion, left  -     predniSONE 5 MG (21) TBPK; Take 5 mg by mouth daily 6-5-4-3-2-1,

## 2024-06-17 ENCOUNTER — COMMUNITY OUTREACH (OUTPATIENT)
Dept: FAMILY MEDICINE CLINIC | Age: 59
End: 2024-06-17

## 2024-06-26 RX ORDER — PROPAFENONE HYDROCHLORIDE 325 MG/1
325 CAPSULE, EXTENDED RELEASE ORAL 2 TIMES DAILY
Qty: 60 CAPSULE | Refills: 1 | Status: SHIPPED | OUTPATIENT
Start: 2024-06-26

## 2024-08-21 RX ORDER — PROPAFENONE HYDROCHLORIDE 325 MG/1
325 CAPSULE, EXTENDED RELEASE ORAL 2 TIMES DAILY
Qty: 60 CAPSULE | Refills: 1 | Status: SHIPPED | OUTPATIENT
Start: 2024-08-21

## 2024-10-21 RX ORDER — PROPAFENONE HYDROCHLORIDE 325 MG/1
325 CAPSULE, EXTENDED RELEASE ORAL 2 TIMES DAILY
Qty: 60 CAPSULE | Refills: 1 | Status: SHIPPED | OUTPATIENT
Start: 2024-10-21

## 2024-11-15 ENCOUNTER — OFFICE VISIT (OUTPATIENT)
Dept: CARDIOLOGY | Facility: CLINIC | Age: 59
End: 2024-11-15
Payer: COMMERCIAL

## 2024-11-15 ENCOUNTER — HOSPITAL ENCOUNTER (OUTPATIENT)
Facility: HOSPITAL | Age: 59
Discharge: HOME | End: 2024-11-15
Payer: COMMERCIAL

## 2024-11-15 VITALS
DIASTOLIC BLOOD PRESSURE: 88 MMHG | BODY MASS INDEX: 35.68 KG/M2 | OXYGEN SATURATION: 99 % | HEIGHT: 64 IN | HEART RATE: 73 BPM | SYSTOLIC BLOOD PRESSURE: 160 MMHG | WEIGHT: 209 LBS

## 2024-11-15 DIAGNOSIS — R01.1 CARDIAC MURMUR: ICD-10-CM

## 2024-11-15 DIAGNOSIS — E78.2 MIXED HYPERLIPIDEMIA: ICD-10-CM

## 2024-11-15 DIAGNOSIS — I48.0 PAROXYSMAL ATRIAL FIBRILLATION: Primary | ICD-10-CM

## 2024-11-15 PROCEDURE — 93005 ELECTROCARDIOGRAM TRACING: CPT

## 2024-11-15 PROCEDURE — 99214 OFFICE O/P EST MOD 30 MIN: CPT | Performed by: HOSPITALIST

## 2024-11-15 PROCEDURE — 93000 ELECTROCARDIOGRAM COMPLETE: CPT | Performed by: HOSPITALIST

## 2024-11-15 RX ORDER — CHLORTHALIDONE 25 MG/1
25 TABLET ORAL DAILY
Qty: 90 TABLET | Refills: 3 | Status: SHIPPED | OUTPATIENT
Start: 2024-11-15

## 2024-11-15 NOTE — PROGRESS NOTES
[facility] AT [address], [address]  [place], [place], [address] [phone]  [facility]           OUTPATIENT CLINIC FOLLOW UP NOTE    Patient Care Team:  Patient Care Team:  Sandra Stanley DO as PCP - General (Family Medicine)  Michael Fisher MD as Consulting Physician (Cardiology)    Subjective:      Chief Complaint   Patient presents with    Paroxysmal atrial fibrillation    Leg Swelling       HPI:    Caroline Harding is a 59 y.o. female.  Cardiac problem list:  Paroxysmal atrial fibrillation  CHADsVASc score of 1   Sleep issues with metoprolol  Switched flecainide/metoprolol to propafenone 5/2020  Cardiac murmur  Noted by cardiology 4/2021, echo in 2019 with mild TR  Hypothyroidism  Osteoarthritis  Asthma  Dependent swelling    Since today for follow-up.  Doing well from a cardiac standpoint since her last visit.  Has infrequent, mild palpitations. Denies chest pain, shortness of breath, or lightheadedness.  Having some lower extremity edema.  Blood pressure averaging 140's systolic at home.     Review of Systems:  As noted in the HPI    PFSH:  Patient Active Problem List   Diagnosis    Paroxysmal atrial fibrillation    A-fib    Cardiac murmur         Current Outpatient Medications:     Alpha-Lipoic Acid 200 MG capsule, Take 200 mg by mouth 3 times a day., Disp: , Rfl:     azelastine (ASTELIN) 0.1 % nasal spray, Administer 2 sprays into the nostril(s) as directed by provider 2 (Two) Times a Day. Use in each nostril as directed as needed, Disp: , Rfl:     B Complex Vitamins (B COMPLEX 1 PO), Take  by mouth., Disp: , Rfl:     CALCIUM PO, Take  by mouth Daily., Disp: , Rfl:     cetirizine (zyrTEC) 10 MG tablet, Take 1 tablet by mouth 2 (Two) Times a Day., Disp: , Rfl:     COLLAGEN PO, Take 3,000 mg by mouth 2 (Two) Times a Day., Disp: , Rfl:     fexofenadine (ALLEGRA) 180 MG tablet, Take 1 tablet by mouth 2 (Two) Times a Day., Disp: , Rfl:     fluticasone  "(FLONASE) 50 MCG/ACT nasal spray, Administer 2 sprays into the nostril(s) as directed by provider Daily., Disp: , Rfl:     glucosamine-chondroitin 500-400 MG capsule capsule, Take  by mouth 3 (Three) Times a Day With Meals., Disp: , Rfl:     Magnesium 500 MG capsule, Take 500 mg by mouth Daily. 3 tabs daily, Disp: , Rfl:     metoprolol tartrate (LOPRESSOR) 25 MG tablet, TAKE 1 TABLET BY MOUTH DAILY AS NEEDED (PALPITATIONS)., Disp: 60 tablet, Rfl: 11    montelukast (SINGULAIR) 10 MG tablet, 1 tablet Daily., Disp: , Rfl:     Multiple Vitamins-Minerals (MULTIVITAMIN WITH MINERALS) tablet tablet, Take 1 tablet by mouth Daily., Disp: , Rfl:     Omega-3 Fatty Acids (FISH OIL EXTRA STRENGTH PO), Take  by mouth., Disp: , Rfl:     progesterone (PROMETRIUM) 100 MG capsule, Take 150 mg by mouth Daily. Take 175 mg daily, Disp: , Rfl:     propafenone SR (RYTHMOL SR) 325 MG 12 hr capsule, TAKE 1 CAPSULE BY MOUTH 2 (TWO) TIMES A DAY., Disp: 60 capsule, Rfl: 1    Qvar RediHaler 80 MCG/ACT inhaler, , Disp: , Rfl:     Thyroid 60 MG PO tablet, Take 1 tablet by mouth Daily., Disp: , Rfl:     TURMERIC CURCUMIN PO, Take 1,000 mg by mouth Daily., Disp: , Rfl:     VITAMIN D PO, Take  by mouth Daily., Disp: , Rfl:     chlorthalidone (HYGROTON) 25 MG tablet, Take 1 tablet by mouth Daily., Disp: 90 tablet, Rfl: 3    Allergies   Allergen Reactions    Penicillins Anaphylaxis    Gluten Meal Other (See Comments)    Sulfa Antibiotics Hives    Wheat Other (See Comments)        reports that she has never smoked. She has never used smokeless tobacco.      Objective:   Physical exam:  /88 (BP Location: Left arm, Patient Position: Sitting)   Pulse 73   Ht 162.6 cm (64\")   Wt 94.8 kg (209 lb)   SpO2 99%   BMI 35.87 kg/m²   CONSTITUTIONAL: No acute distress  CARDIOVASCULAR: Regular rate and rhythm with normal S1 and S2. Systolic murmur at sternal border heard today, soft, nonradiating  PERIPHERAL VASCULAR: Normal radial pulse.  No carotid " "bruit    Labs:    Lab Results   Component Value Date    CHOL 158 07/31/2019     Lab Results   Component Value Date    TRIG 131 07/31/2019     Lab Results   Component Value Date    HDL 58 07/31/2019     Lab Results   Component Value Date    LDL 74 07/31/2019     No components found for: \"LDLDIRECTC\"    Diagnostic Data:      ECG 12 Lead    Date/Time: 11/15/2024 2:52 PM  Performed by: Sandra Reddy APRN    Authorized by: Sandra Reddy APRN  Comparison: compared with previous ECG from 5/3/2024  Similar to previous ECG  Rhythm: sinus rhythm  Rate: normal  BPM: 66  Conduction: incomplete right bundle branch block and left anterior fascicular block          Results for orders placed during the hospital encounter of 07/30/19    Transthoracic Echo Complete With Contrast if Necessary Per Protocol    Interpretation Summary  · Estimated EF = 62%.  · Left ventricular systolic function is normal.  · Mild tricuspid valve regurgitation is present.  · Calculated right ventricular systolic pressure from tricuspid regurgitation is 36 mmHg.      Assessment and Plan:     Paroxysmal atrial fibrillation  Insomnia  Thyroid disorder  -Continue propafenone SR at 325 mg twice daily.  -Continue Lopressor 25 mg daily as needed for increased palpitations    -[id number]=0 (gender)      -Patient following with endocrinology for thyroid levels.   -Discussed evaluation by sleep medicine again today but patient wishes to hold off.     -Discussed increased exercise/activity.  Has joint difficulties.  Encouraged recumbent activities/exercises    Soft cardiac murmur, possible flow murmur  -Noted by us and by another provider in the spring 2021.  Now only heard on occasion  -Based on its location and intermittent nature, likely representative of patient's previously noted mild tricuspid regurgitation by echo in 2019  -If the patient's clinical status or the quality of the murmur changes, will repeat echo  -Clinical monitoring for " now    Hypertension   Lower extremity edema  -Blood pressure above goal. Also with some LE edema.   -Adding chlorthalidone 25 mg daily for HTN and edema.    -Patient to monitor blood pressure for the next week and call our office in one week to report readings.   -Repeat labs in one week.  External lab forms will be printed and mailed to patient.       - Return in about 6 months (around 5/15/2025) for Next scheduled follow up with Dr. Fisher .    Electronically signed by EDOUARD Roger, 11/15/24, 3:57 PM EST.

## 2024-12-23 RX ORDER — PROPAFENONE HYDROCHLORIDE 325 MG/1
325 CAPSULE, EXTENDED RELEASE ORAL 2 TIMES DAILY
Qty: 60 CAPSULE | Refills: 1 | Status: SHIPPED | OUTPATIENT
Start: 2024-12-23

## 2025-01-08 ENCOUNTER — OFFICE VISIT (OUTPATIENT)
Age: 60
End: 2025-01-08
Payer: COMMERCIAL

## 2025-01-08 VITALS
DIASTOLIC BLOOD PRESSURE: 76 MMHG | WEIGHT: 212.5 LBS | OXYGEN SATURATION: 98 % | SYSTOLIC BLOOD PRESSURE: 128 MMHG | BODY MASS INDEX: 37.05 KG/M2 | TEMPERATURE: 98.1 F | HEART RATE: 94 BPM

## 2025-01-08 DIAGNOSIS — B37.9 ANTIBIOTIC-INDUCED YEAST INFECTION: ICD-10-CM

## 2025-01-08 DIAGNOSIS — R05.1 ACUTE COUGH: Primary | ICD-10-CM

## 2025-01-08 DIAGNOSIS — T36.95XA ANTIBIOTIC-INDUCED YEAST INFECTION: ICD-10-CM

## 2025-01-08 DIAGNOSIS — J01.00 ACUTE NON-RECURRENT MAXILLARY SINUSITIS: ICD-10-CM

## 2025-01-08 DIAGNOSIS — L25.9 CONTACT DERMATITIS, UNSPECIFIED CONTACT DERMATITIS TYPE, UNSPECIFIED TRIGGER: ICD-10-CM

## 2025-01-08 LAB
EXP DATE SOLUTION: NORMAL
EXP DATE SWAB: NORMAL
EXPIRATION DATE: NORMAL
INFLUENZA A RNA, POC: NORMAL
INFLUENZA B RNA, POC: NORMAL
LOT NUMBER POC: NORMAL
LOT NUMBER SOLUTION: NORMAL
LOT NUMBER SWAB: NORMAL
SARS-COV-2 RNA, POC: NEGATIVE
VALID INTERNAL CONTROL: NORMAL

## 2025-01-08 PROCEDURE — 96372 THER/PROPH/DIAG INJ SC/IM: CPT | Performed by: NURSE PRACTITIONER

## 2025-01-08 PROCEDURE — 99213 OFFICE O/P EST LOW 20 MIN: CPT | Performed by: NURSE PRACTITIONER

## 2025-01-08 RX ORDER — TRIAMCINOLONE ACETONIDE 1 MG/G
CREAM TOPICAL
Qty: 80 G | Refills: 0 | Status: SHIPPED | OUTPATIENT
Start: 2025-01-08

## 2025-01-08 RX ORDER — FLUCONAZOLE 150 MG/1
150 TABLET ORAL DAILY
Qty: 7 TABLET | Refills: 0 | Status: SHIPPED | OUTPATIENT
Start: 2025-01-08

## 2025-01-08 RX ORDER — METHYLPREDNISOLONE ACETATE 80 MG/ML
120 INJECTION, SUSPENSION INTRA-ARTICULAR; INTRALESIONAL; INTRAMUSCULAR; SOFT TISSUE ONCE
Status: COMPLETED | OUTPATIENT
Start: 2025-01-08 | End: 2025-01-08

## 2025-01-08 RX ORDER — AZITHROMYCIN 250 MG/1
TABLET, FILM COATED ORAL
Qty: 6 TABLET | Refills: 0 | Status: SHIPPED | OUTPATIENT
Start: 2025-01-08 | End: 2025-01-18

## 2025-01-08 RX ADMIN — METHYLPREDNISOLONE ACETATE 120 MG: 80 INJECTION, SUSPENSION INTRA-ARTICULAR; INTRALESIONAL; INTRAMUSCULAR; SOFT TISSUE at 14:21

## 2025-01-08 SDOH — ECONOMIC STABILITY: FOOD INSECURITY: WITHIN THE PAST 12 MONTHS, THE FOOD YOU BOUGHT JUST DIDN'T LAST AND YOU DIDN'T HAVE MONEY TO GET MORE.: NEVER TRUE

## 2025-01-08 SDOH — ECONOMIC STABILITY: FOOD INSECURITY: WITHIN THE PAST 12 MONTHS, YOU WORRIED THAT YOUR FOOD WOULD RUN OUT BEFORE YOU GOT MONEY TO BUY MORE.: NEVER TRUE

## 2025-01-08 ASSESSMENT — ENCOUNTER SYMPTOMS
ABDOMINAL PAIN: 0
WHEEZING: 0
SINUS PAIN: 1
DIARRHEA: 0
SINUS PRESSURE: 1
NAUSEA: 0
VOMITING: 0
RHINORRHEA: 1
SORE THROAT: 0
COUGH: 0
ALLERGIC/IMMUNOLOGIC NEGATIVE: 1
SHORTNESS OF BREATH: 0

## 2025-01-08 ASSESSMENT — PATIENT HEALTH QUESTIONNAIRE - PHQ9
6. FEELING BAD ABOUT YOURSELF - OR THAT YOU ARE A FAILURE OR HAVE LET YOURSELF OR YOUR FAMILY DOWN: NOT AT ALL
8. MOVING OR SPEAKING SO SLOWLY THAT OTHER PEOPLE COULD HAVE NOTICED. OR THE OPPOSITE, BEING SO FIGETY OR RESTLESS THAT YOU HAVE BEEN MOVING AROUND A LOT MORE THAN USUAL: NOT AT ALL
SUM OF ALL RESPONSES TO PHQ QUESTIONS 1-9: 0
SUM OF ALL RESPONSES TO PHQ QUESTIONS 1-9: 0
10. IF YOU CHECKED OFF ANY PROBLEMS, HOW DIFFICULT HAVE THESE PROBLEMS MADE IT FOR YOU TO DO YOUR WORK, TAKE CARE OF THINGS AT HOME, OR GET ALONG WITH OTHER PEOPLE: NOT DIFFICULT AT ALL
2. FEELING DOWN, DEPRESSED OR HOPELESS: NOT AT ALL
4. FEELING TIRED OR HAVING LITTLE ENERGY: NOT AT ALL
SUM OF ALL RESPONSES TO PHQ QUESTIONS 1-9: 0
7. TROUBLE CONCENTRATING ON THINGS, SUCH AS READING THE NEWSPAPER OR WATCHING TELEVISION: NOT AT ALL
9. THOUGHTS THAT YOU WOULD BE BETTER OFF DEAD, OR OF HURTING YOURSELF: NOT AT ALL
3. TROUBLE FALLING OR STAYING ASLEEP: NOT AT ALL
5. POOR APPETITE OR OVEREATING: NOT AT ALL
SUM OF ALL RESPONSES TO PHQ QUESTIONS 1-9: 0
SUM OF ALL RESPONSES TO PHQ9 QUESTIONS 1 & 2: 0
1. LITTLE INTEREST OR PLEASURE IN DOING THINGS: NOT AT ALL

## 2025-01-08 NOTE — PROGRESS NOTES
SUBJECTIVE:    Magdalena Jacobs is a 59 y.o. female    Patient here for complaints of head congestion/facial tenderness and bilateral ear pain, onset of symptoms was 12/10/2024. SHe reports she developed a low grade fever yesterday. Temp 100.5 She reports she does not have a lot of nasal drainage but she does it is dark and bloody.     She also complains of an itchy rash to bilateral chest, bilateral upper back and left upper arm.  She reports she has sensitive skin but has not used any new products.  They have been without electric and have been heating with propane.  That is the only difference she can think of.    Ear Pain   Associated symptoms include rhinorrhea (bloody, dark yellow nasal drainage). Pertinent negatives include no abdominal pain, coughing, diarrhea, sore throat or vomiting.   Fever   Associated symptoms include congestion and ear pain. Pertinent negatives include no abdominal pain, coughing, diarrhea, nausea, sore throat, vomiting or wheezing.        Chief Complaint   Patient presents with    Head Congestion           Ear Pain           Fever        Review of Systems   Constitutional:  Positive for fatigue and fever. Negative for appetite change and chills.   HENT:  Positive for congestion, ear pain, postnasal drip, rhinorrhea (bloody, dark yellow nasal drainage), sinus pressure (maxillary and frontal), sinus pain and sneezing. Negative for sore throat.    Respiratory:  Negative for cough, shortness of breath and wheezing.    Gastrointestinal:  Negative for abdominal pain, diarrhea, nausea and vomiting.   Endocrine: Negative.    Genitourinary: Negative.    Musculoskeletal: Negative.    Skin: Negative.    Allergic/Immunologic: Negative.    Neurological: Negative.    Hematological: Negative.    Psychiatric/Behavioral: Negative.          OBJECTIVE:    /76   Pulse 94   Temp 98.1 °F (36.7 °C) (Infrared)   Wt 96.4 kg (212 lb 8 oz)   SpO2 98% Comment: RA  BMI 37.05 kg/m²    Physical

## 2025-01-08 NOTE — PROGRESS NOTES
Chief Complaint   Patient presents with    Head Congestion           Ear Pain           Fever     Patient here for complaints of head congestion/facial tenderness, bilateral ear pain, and fever that started yesterday. She reports she does not have a lot of nasal drainage but she does it is dark and bloody.

## 2025-01-14 ENCOUNTER — TELEMEDICINE (OUTPATIENT)
Age: 60
End: 2025-01-14
Payer: COMMERCIAL

## 2025-01-14 DIAGNOSIS — B08.4 HAND, FOOT AND MOUTH DISEASE: ICD-10-CM

## 2025-01-14 DIAGNOSIS — J45.909 ASTHMA, UNSPECIFIED ASTHMA SEVERITY, UNSPECIFIED WHETHER COMPLICATED, UNSPECIFIED WHETHER PERSISTENT: Primary | ICD-10-CM

## 2025-01-14 PROCEDURE — 99213 OFFICE O/P EST LOW 20 MIN: CPT | Performed by: NURSE PRACTITIONER

## 2025-01-14 RX ORDER — PREDNISONE 20 MG/1
20 TABLET ORAL 2 TIMES DAILY
Qty: 14 TABLET | Refills: 1 | Status: SHIPPED | OUTPATIENT
Start: 2025-01-14 | End: 2025-01-21

## 2025-01-14 NOTE — PROGRESS NOTES
Provider, MD Jocelynn   turmeric 500 MG CAPS Take by mouth daily  Jocelynn Stubbs MD   Nutritional Supplements (JOINT FORMULA PO) Take by mouth  Jocelynn Stubbs MD   Collagen-Vitamin C 1000-10 MG TABS Take by mouth  Jocelynn Stubbs MD   calcium carbonate (OSCAL) 500 MG TABS tablet Take 1 tablet by mouth daily  Jocelynn Stubbs MD   magnesium 30 MG tablet Take 1 tablet by mouth 2 times daily  Jocelynn Stubbs MD   b complex vitamins capsule Take 1 capsule by mouth daily  Jocelynn Stubbs MD   vitamin D3 (CHOLECALCIFEROL) 10 MCG (400 UNIT) TABS tablet Take 1 tablet by mouth daily  Jocelynn Stubbs MD   Alpha Lipoic Acid-Biotin -450 MG-MCG TBCR Take by mouth  Jocelynn Stubbs MD   propafenone (RYTHMOL SR) 325 MG extended release capsule Take 1 capsule by mouth 2 times daily  Jocelynn Stubbs MD   QVAR REDIHALER 80 MCG/ACT AERB inhaler   Jocelynn Stubbs MD   Multiple Vitamins-Iron TABS Take 1 tablet by mouth daily  Jocelynn Stubbs MD   progesterone (PROMETRIUM) 100 MG CAPS capsule Take 175 mg by mouth nightly  Jocelynn Stubbs MD   montelukast (SINGULAIR) 10 MG tablet Take 1 tablet by mouth daily  Herminia Flores, RN   ARMOUR THYROID 60 MG tablet Take 1 tablet by mouth daily  Ella Prescott APRN - CNP   fluticasone (FLONASE) 50 MCG/ACT nasal spray 1 spray by Each Nostril route daily  Jocelynn Stubbs MD       Social History     Tobacco Use    Smoking status: Never    Smokeless tobacco: Never   Substance Use Topics    Alcohol use: Yes     Alcohol/week: 1.0 standard drink of alcohol     Types: 1 Glasses of wine per week    Drug use: Never        Allergies   Allergen Reactions    Penicillins Hives and Shortness Of Breath    Gluten Meal     Sulfa Antibiotics Hives    Wheat      Patient unable to get logged into [facility]. Mychart status is pending.   ASSESSMENT/PLAN:  1. Asthma, unspecified asthma severity, unspecified whether

## 2025-02-19 RX ORDER — PROPAFENONE HYDROCHLORIDE 325 MG/1
325 CAPSULE, EXTENDED RELEASE ORAL 2 TIMES DAILY
Qty: 180 CAPSULE | Refills: 3 | Status: SHIPPED | OUTPATIENT
Start: 2025-02-19

## 2025-05-14 NOTE — PROGRESS NOTES
[facility] AT [address], [address]  [place], [place], [address] [phone]  [email or web address]           OUTPATIENT CLINIC FOLLOW UP NOTE    Patient Care Team:  Patient Care Team:  Sandra Stanley DO as PCP - General (Family Medicine)  Michael Fisher MD as Consulting Physician (Cardiology)    Subjective:      Chief Complaint   Patient presents with    Paroxysmal atrial fibrillation       HPI:    Caroline Hardign is a 60 y.o. female.  Cardiac problem list:  Paroxysmal atrial fibrillation  CHADsVASc score of 1   Sleep issues with metoprolol  Switched flecainide/metoprolol to propafenone 5/2020  Cardiac murmur  Noted by cardiology 4/2021, echo in 2019 with mild TR  Hypothyroidism  Osteoarthritis  Asthma  Dependent swelling    Since today for follow-up.  Doing well from a cardiac standpoint.  Blood pressure at goal.  Edema improved on chlorthalidone.  Denies chest pain, shortness of breath.  Has occasional elevated heart rate in the 110's, mostly asymptomatic. Occasional palpitations.     Review of Systems:  As noted in the HPI    PFSH:  Patient Active Problem List   Diagnosis    Paroxysmal atrial fibrillation    A-fib    Cardiac murmur         Current Outpatient Medications:     Alpha Lipoic Acid-Biotin -450 MG-MCG tablet controlled-release, Take  by mouth., Disp: , Rfl:     Alpha-Lipoic Acid 200 MG capsule, Take 200 mg by mouth 3 times a day., Disp: , Rfl:     azelastine (ASTELIN) 0.1 % nasal spray, Administer 2 sprays into the nostril(s) as directed by provider 2 (Two) Times a Day. Use in each nostril as directed as needed, Disp: , Rfl:     B Complex Vitamins (B COMPLEX 1 PO), Take  by mouth., Disp: , Rfl:     b complex vitamins capsule, Take 1 capsule by mouth Daily., Disp: , Rfl:     CALCIUM PO, Take  by mouth Daily., Disp: , Rfl:     CALCIUM PO, Take 1 tablet by mouth Daily., Disp: , Rfl:     cetirizine (zyrTEC) 10 MG tablet, Take 1 tablet by mouth 2 (Two)  Times a Day., Disp: , Rfl:     chlorthalidone (HYGROTON) 25 MG tablet, Take 1 tablet by mouth Daily., Disp: 90 tablet, Rfl: 3    cholecalciferol (VITAMIN D3) 10 MCG (400 UNIT) tablet, Take 1 tablet by mouth Daily., Disp: , Rfl:     COLLAGEN PO, Take 3,000 mg by mouth 2 (Two) Times a Day., Disp: , Rfl:     Collagen-Vitamin C 1000-10 MG tablet, Take  by mouth., Disp: , Rfl:     estradiol (MINIVELLE, VIVELLE-DOT) 0.075 MG/24HR patch, , Disp: , Rfl:     fexofenadine (ALLEGRA) 180 MG tablet, Take 1 tablet by mouth 2 (Two) Times a Day., Disp: , Rfl:     fluticasone (FLONASE) 50 MCG/ACT nasal spray, Administer 2 sprays into the nostril(s) as directed by provider Daily., Disp: , Rfl:     glucosamine-chondroitin 500-400 MG capsule capsule, Take  by mouth 3 (Three) Times a Day With Meals., Disp: , Rfl:     magnesium 30 MG tablet, Take 1 tablet by mouth 2 (Two) Times a Day., Disp: , Rfl:     Magnesium 500 MG capsule, Take 500 mg by mouth Daily. 3 tabs daily, Disp: , Rfl:     metoprolol tartrate (LOPRESSOR) 25 MG tablet, TAKE 1 TABLET BY MOUTH DAILY AS NEEDED (PALPITATIONS)., Disp: 60 tablet, Rfl: 11    montelukast (SINGULAIR) 10 MG tablet, 1 tablet Daily., Disp: , Rfl:     Multiple Vitamins-Minerals (MULTIVITAMIN WITH MINERALS) tablet tablet, Take 1 tablet by mouth Daily., Disp: , Rfl:     Omega-3 Fatty Acids (FISH OIL EXTRA STRENGTH PO), Take  by mouth., Disp: , Rfl:     progesterone (PROMETRIUM) 100 MG capsule, Take 150 mg by mouth Daily. Take 175 mg daily, Disp: , Rfl:     Progesterone (PROMETRIUM) 100 MG capsule, Take 175 mg by mouth., Disp: , Rfl:     propafenone SR (RYTHMOL SR) 325 MG 12 hr capsule, TAKE 1 CAPSULE BY MOUTH 2 (TWO) TIMES A DAY., Disp: 180 capsule, Rfl: 3    Qvar RediHaler 80 MCG/ACT inhaler, , Disp: , Rfl:     Thyroid 60 MG PO tablet, Take 1 tablet by mouth Daily., Disp: , Rfl:     TURMERIC CURCUMIN PO, Take 1,000 mg by mouth Daily., Disp: , Rfl:     VITAMIN D PO, Take  by mouth Daily., Disp: , Rfl:  "    Allergies   Allergen Reactions    Penicillins Anaphylaxis    Gluten Meal Other (See Comments)    Sulfa Antibiotics Hives    Wheat Other (See Comments)        reports that she has never smoked. She has never used smokeless tobacco.      Objective:   Physical exam:  /74 (BP Location: Left arm, Patient Position: Sitting)   Pulse 77   Ht 162.6 cm (64\")   Wt 98 kg (216 lb)   SpO2 97%   BMI 37.08 kg/m²   CONSTITUTIONAL: No acute distress  CARDIOVASCULAR: Regular rate and rhythm with normal S1 and S2. Systolic murmur at sternal border heard today, soft, nonradiating  PERIPHERAL VASCULAR: Normal radial pulse.  No carotid bruit    Labs:    Lab Results   Component Value Date    CHOL 158 07/31/2019     Lab Results   Component Value Date    TRIG 131 07/31/2019     Lab Results   Component Value Date    HDL 58 07/31/2019     Lab Results   Component Value Date    LDL 74 07/31/2019     No components found for: \"LDLDIRECTC\"    Diagnostic Data:      ECG 12 Lead    Date/Time: 5/16/2025 2:44 PM  Performed by: Sandra Reddy APRN    Authorized by: Sandra Reddy APRN  Comparison: compared with previous ECG from 11/15/2024  Similar to previous ECG  Rhythm: sinus rhythm  Rate: normal  BPM: 75  Conduction: left anterior fascicular block  Comments: Biatrial enlargement           Results for orders placed during the hospital encounter of 07/30/19    Transthoracic Echo Complete With Contrast if Necessary Per Protocol    Interpretation Summary  · Estimated EF = 62%.  · Left ventricular systolic function is normal.  · Mild tricuspid valve regurgitation is present.  · Calculated right ventricular systolic pressure from tricuspid regurgitation is 36 mmHg.      Assessment and Plan:     Paroxysmal atrial fibrillation  Insomnia  Thyroid disorder  -Continue propafenone SR at 325 mg twice daily.  -Continue Lopressor 25 mg daily as needed for increased palpitations  -[id number]=4 (gender)      -Patient following with endocrinology " for thyroid levels.     -Discussed increased exercise/activity.  Has joint difficulties.  Encouraged recumbent activities/exercises    Soft cardiac murmur, possible flow murmur  -Noted by us and by another provider in the spring 2021.  Now only heard on occasion  -Based on its location and intermittent nature, likely representative of patient's previously noted mild tricuspid regurgitation by echo in 2019  -If the patient's clinical status or the quality of the murmur changes, will repeat echo  -Clinical monitoring for now    Hypertension   Lower extremity edema  -Blood pressure at goal.   -Continue chlorthalidone.   -Repeat BMP.  Patient has external lab form from last visit.       - Return in about 6 months (around 11/16/2025) for Next scheduled follow up with Dr. Fisher .    Electronically signed by EDOUARD Roger, 05/16/25, 3:11 PM EDT.

## 2025-05-16 ENCOUNTER — OFFICE VISIT (OUTPATIENT)
Dept: CARDIOLOGY | Facility: CLINIC | Age: 60
End: 2025-05-16
Payer: COMMERCIAL

## 2025-05-16 ENCOUNTER — HOSPITAL ENCOUNTER (OUTPATIENT)
Facility: HOSPITAL | Age: 60
Discharge: HOME | End: 2025-05-16
Payer: MEDICAID

## 2025-05-16 VITALS
BODY MASS INDEX: 36.88 KG/M2 | WEIGHT: 216 LBS | OXYGEN SATURATION: 97 % | DIASTOLIC BLOOD PRESSURE: 74 MMHG | HEART RATE: 77 BPM | HEIGHT: 64 IN | SYSTOLIC BLOOD PRESSURE: 128 MMHG

## 2025-05-16 DIAGNOSIS — R01.1 CARDIAC MURMUR: ICD-10-CM

## 2025-05-16 DIAGNOSIS — I48.0 PAROXYSMAL ATRIAL FIBRILLATION: Primary | ICD-10-CM

## 2025-05-16 PROCEDURE — 93000 ELECTROCARDIOGRAM COMPLETE: CPT | Performed by: HOSPITALIST

## 2025-05-16 PROCEDURE — 1159F MED LIST DOCD IN RCRD: CPT | Performed by: HOSPITALIST

## 2025-05-16 PROCEDURE — 99214 OFFICE O/P EST MOD 30 MIN: CPT | Performed by: HOSPITALIST

## 2025-05-16 PROCEDURE — 93005 ELECTROCARDIOGRAM TRACING: CPT

## 2025-05-16 PROCEDURE — 1160F RVW MEDS BY RX/DR IN RCRD: CPT | Performed by: HOSPITALIST

## 2025-05-16 RX ORDER — PROGESTERONE 100 MG/1
175 CAPSULE ORAL
COMMUNITY

## 2025-05-16 RX ORDER — VITAMIN B COMPLEX
1 CAPSULE ORAL DAILY
COMMUNITY

## 2025-05-16 RX ORDER — MAGNESIUM 30 MG
1 TABLET ORAL 2 TIMES DAILY
COMMUNITY

## 2025-05-16 RX ORDER — ESTRADIOL 0.07 MG/D
FILM, EXTENDED RELEASE TRANSDERMAL
COMMUNITY
Start: 2025-05-05

## 2025-05-16 RX ORDER — OMEGA-3S/DHA/EPA/FISH OIL/D3 300MG-1000
1 CAPSULE ORAL DAILY
COMMUNITY

## 2025-05-19 RX ORDER — METOPROLOL TARTRATE 25 MG/1
25 TABLET, FILM COATED ORAL DAILY PRN
Qty: 60 TABLET | Refills: 12 | Status: SHIPPED | OUTPATIENT
Start: 2025-05-19

## 2025-08-11 RX ORDER — CHLORTHALIDONE 25 MG/1
25 TABLET ORAL DAILY
Qty: 30 TABLET | Refills: 0 | Status: SHIPPED | OUTPATIENT
Start: 2025-08-11